# Patient Record
Sex: FEMALE | Race: BLACK OR AFRICAN AMERICAN | URBAN - METROPOLITAN AREA
[De-identification: names, ages, dates, MRNs, and addresses within clinical notes are randomized per-mention and may not be internally consistent; named-entity substitution may affect disease eponyms.]

---

## 2017-02-17 ENCOUNTER — OFFICE VISIT (OUTPATIENT)
Dept: CARDIOLOGY CLINIC | Age: 57
End: 2017-02-17

## 2017-02-17 VITALS
WEIGHT: 142 LBS | HEIGHT: 60 IN | SYSTOLIC BLOOD PRESSURE: 137 MMHG | DIASTOLIC BLOOD PRESSURE: 77 MMHG | BODY MASS INDEX: 27.88 KG/M2 | HEART RATE: 76 BPM

## 2017-02-17 DIAGNOSIS — I10 ESSENTIAL HYPERTENSION: Primary | ICD-10-CM

## 2017-02-17 DIAGNOSIS — B20 HIV (HUMAN IMMUNODEFICIENCY VIRUS INFECTION) (HCC): ICD-10-CM

## 2017-02-17 DIAGNOSIS — R07.9 CHEST PAIN, UNSPECIFIED TYPE: ICD-10-CM

## 2017-02-17 NOTE — PROGRESS NOTES
1. Have you been to the ER, urgent care clinic since your last visit? Hospitalized since your last visit? Yes, [facility]    2. Have you seen or consulted any other health care providers outside of the [address] [place] [address] since your last visit? Include any pap smears or colon screening. Yes, pcp  ENT   3. Since your last visit, have you had any of the following symptoms? Chest pain aching, palpitations and some dizziness          4. Have you had any blood work, X-rays or cardiac testing? Yes, [facility]        5. Where do you normally have your labs drawn?   [facility]    6. Do you need any refills today?    no

## 2017-02-17 NOTE — PROGRESS NOTES
HISTORY OF PRESENT ILLNESS  Cynthia Gardner is a 60 y.o. female. HPI Comments:       Chest Pain (Angina)    The history is provided by the patient. This is a recurrent problem. The current episode started more than 1 week ago. The problem has been gradually improving. The problem occurs every several days. The pain is associated with rest and movement. The pain is present in the left side. The pain is mild. The quality of the pain is described as dull. The pain does not radiate. Associated symptoms include shortness of breath. Pertinent negatives include no abdominal pain, no claudication, no cough, no dizziness, no fever, no headaches, no hemoptysis, no nausea, no orthopnea, no palpitations, no PND, no sputum production, no vomiting and no weakness. Valvular Heart Disease   Associated symptoms include chest pain and shortness of breath. Pertinent negatives include no abdominal pain and no headaches. Review of Systems   Constitutional: Negative for chills and fever. HENT: Negative for nosebleeds. Eyes: Negative for blurred vision and double vision. Respiratory: Positive for shortness of breath. Negative for cough, hemoptysis, sputum production and wheezing. Cardiovascular: Positive for chest pain. Negative for palpitations, orthopnea, claudication, leg swelling and PND. Gastrointestinal: Negative for abdominal pain, heartburn, nausea and vomiting. Musculoskeletal: Negative for myalgias. Skin: Negative for rash. Neurological: Negative for dizziness, weakness and headaches. Endo/Heme/Allergies: Does not bruise/bleed easily.      Family History   Problem Relation Age of Onset    Heart Disease Other      Positive family history of ischemic heart disease    Heart Surgery Neg Hx     Heart Attack Neg Hx        Past Medical History   Diagnosis Date    Chest pain, unspecified      Likely GERD, noncardiac, normal nuc scan in past    Chest pain, unspecified 2/20/2014     likely non cardiac  negative maximal est monitor clinically     GERD (gastroesophageal reflux disease)     HIV (human immunodeficiency virus infection) ([facility] [address] [address] 2/20/2014    Human immunodeficiency virus (HIV) [facility] [address] [facility]     Mitral valve disorders      No MVP reported on echo: 11/2008, trace MR    Multiple lesions of mitral and aortic valve      Valve lesions    Other and unspecified hyperlipidemia     Palpitations 12/12/2014     occasional episodes more with caffein discussed diet     Shortness of breath 12/12/2014     exertional r/o cmp        Past Surgical History   Procedure Laterality Date    Hx breast reduction      Hx other surgical       esophageal dilatation       Social History   Substance Use Topics    Smoking status: Never Smoker    Smokeless tobacco: Never Used    Alcohol use No       Allergies   Allergen Reactions    Latex Rash    Penicillins Not Reported This Time     Penicillin G Sodium *PENICILLINS*    Shellfish Derived Hives and Itching       Outpatient Prescriptions Marked as Taking for the 2/17/17 encounter (Office Visit) with Tong Mancilla MD   Medication Sig Dispense Refill    Omeprazole delayed release (PRILOSEC D/R) 20 mg tablet Take 20 mg by mouth daily.  abacavir-dolutegravir-lamiVUDine (TRIUMEQ) tablet Take  by mouth daily.  amLODIPine (NORVASC) 2.5 mg tablet Take  by mouth daily.  loratadine 10 mg cap Take  by mouth as needed.  ACETAMINOPHEN (TYLENOL EXTRA STRENGTH PO) Take  by mouth.  aspirin delayed-release (ASPIR-81) 81 mg tablet Take  by mouth daily. Visit Vitals    /77    Pulse 76    Ht 5' (1.524 m)    Wt 64.4 kg (142 lb)    BMI 27.73 kg/m2       Physical Exam   Constitutional: She is oriented to person, place, and time. She appears well-developed and well-nourished. HENT:   Head: Normocephalic and atraumatic. Eyes: Conjunctivae are normal.   Neck: Neck supple. No JVD present. No tracheal deviation present. No thyromegaly present. Cardiovascular: Normal rate and regular rhythm. PMI is not displaced. Exam reveals no gallop, no S3 and no decreased pulses. No murmur heard. Pulmonary/Chest: No respiratory distress. She has no wheezes. She has no rales. She exhibits no tenderness. Abdominal: Soft. There is no tenderness. Musculoskeletal: She exhibits no edema. Neurological: She is alert and oriented to person, place, and time. Skin: Skin is warm. Psychiatric: She has a normal mood and affect. Ms. Chasity Thrasher has a reminder for a \"due or due soon\" health maintenance. I have asked that she contact her primary care provider for follow-up on this health maintenance. CARDIOLOGY STUDIES 12/1/2010 6/1/2009 11/1/2008   Myocardial Perfusion Scan Result - nl scan, EF 80% -   Echocardiogram - Complete Result - - EF 65%, trace MR, trace TR   24 hr Holter Monitor Result Within Normal Limits; NSR with rare PAC's and PVC's - -     SUMMARY:echo:12/2014  Left ventricle: Systolic function was normal. Ejection fraction was  estimated in the range of 55 % to 60 %. There were no regional wall motion  abnormalities. Mitral valve: No echocardiographic evidence for prolapse. There was  trivial regurgitation. Tricuspid valve: Insufficient tricuspid regurgitation to estimate  pulmonary artery pressure. Conclusion: 12/2014  1. Negative ischemic ST-T changes at 85% of predicted maximal heart rate. 2. Normal functional capacity. 3. Appropriate heart rate and blood pressure response. 4. Essentially normal stress echo findings but this should be called inconclusive as she did not maintain a very high heart rate during the post-stress echo images. 5. Clinical correlation is suggested. I have personally reviewed patient's records available from hospital and other providers and incorporated findings in patient care. 4/2016  Er note,lab,ekg    Assessment         ICD-10-CM ICD-9-CM    1.  Essential hypertension I10 401.9 controlled  occasional headache   2. HIV (human immunodeficiency virus infection) ([facility] [address] Z21 V08     stable   3. Chest pain, unspecified type R07.9 786.50     atypical non cardiac  negative stress test in past     Will monitor clinically as chest pain atypical and stable now  Medications Discontinued During This Encounter   Medication Reason    albuterol (PROVENTIL HFA, VENTOLIN HFA, PROAIR HFA) 90 mcg/actuation inhaler Not A Current Medication       No orders of the defined types were placed in this encounter. Follow-up Disposition:  Return in about 1 year (around 2/17/2018).

## 2017-02-17 NOTE — LETTER
Marvacee Canavan 1960 2/17/2017 Dear Winsome Roa MD 
 
I had the pleasure of evaluating  Ms. Leonardo Rodríguez in office today. Below are the relevant portions of my assessment and plan of care. ICD-10-CM ICD-9-CM 1. Essential hypertension I10 401.9   
 controlled 
occasional headache 2. HIV (human immunodeficiency virus infection) ([facility] [address] Z21 V08   
 stable 3. Chest pain, unspecified type R07.9 786.50   
 atypical non cardiac 
negative stress test in past  
 
 
Current Outpatient Prescriptions Medication Sig Dispense Refill  Omeprazole delayed release (PRILOSEC D/R) 20 mg tablet Take 20 mg by mouth daily.  abacavir-dolutegravir-lamiVUDine (TRIUMEQ) tablet Take  by mouth daily.  amLODIPine (NORVASC) 2.5 mg tablet Take  by mouth daily.  loratadine 10 mg cap Take  by mouth as needed.  ACETAMINOPHEN (TYLENOL EXTRA STRENGTH PO) Take  by mouth.  aspirin delayed-release (ASPIR-81) 81 mg tablet Take  by mouth daily. No orders of the defined types were placed in this encounter. If you have questions, please do not hesitate to call me. I look forward to following Ms. Jacome along with you. Sincerely, Rakesh Montano MD

## 2017-02-17 NOTE — MR AVS SNAPSHOT
Visit Information Date & Time Provider Department Dept. Phone Encounter #  
 2/17/2017 12:00 PM Edward Brunson MD [facility] [phone] Follow-up Instructions Return in about 1 year (around 2/17/2018). Your Appointments 2/15/2018 11:45 AM  
Follow Up with Edward Brunson MD  
[facility] ([facility]) Appt Note: 1 year [address] [place] [address] [address]  
  
   
 [address] [phone] [address] [address] Upcoming Health Maintenance Date Due Hepatitis C Screening 1960 Pneumococcal 19-64 Highest Risk (1 of 3 - PCV13) 7/23/1977 DTaP/Tdap/Td series (1 - Tdap) 7/23/1979 PAP AKA CERVICAL CYTOLOGY 7/23/1979 BREAST CANCER SCRN MAMMOGRAM 7/23/2008 FOBT Q 1 YEAR AGE 50-75 7/23/2008 INFLUENZA AGE 9 TO ADULT 8/1/2016 Allergies as of 2/17/2017  Review Complete On: 2/17/2017 By: Edward Brunson MD  
  
 Severity Noted Reaction Type Reactions Latex  06/27/2016    Rash Penicillins    Not Reported This Time Penicillin G Sodium *PENICILLINS* Shellfish Derived  06/27/2016    Hives, Itching Current Immunizations  Never Reviewed No immunizations on file. Not reviewed this visit You Were Diagnosed With   
  
 Codes Comments Essential hypertension    -  Primary ICD-10-CM: I10 
ICD-9-CM: 401.9 controlled 
occasional headache  
 HIV (human immunodeficiency virus infection) ([facility]     ICD-10-CM: Z82 ICD-9-CM: V08 stable Chest pain, unspecified type     ICD-10-CM: R07.9 ICD-9-CM: 786.50 atypical non cardiac 
negative stress test in past  
  
Vitals BP Pulse Height(growth percentile) Weight(growth percentile) BMI OB Status 137/77 76 5' (1.524 m) 142 lb (64.4 kg) 27.73 kg/m2 Postmenopausal  
 Smoking Status Never Smoker BMI and BSA Data  Body Mass Index Body Surface Area  
 27.73 kg/m 2 1.65 m 2  
  
  
 Preferred Pharmacy Pharmacy Name Phone [facility] [address] [facility], [address] [place] [address] Rd [phone] Your Updated Medication List  
  
   
This list is accurate as of: 2/17/17 12:25 PM.  Always use your most recent med list.  
  
  
  
  
 abacavir-dolutegravir-lamiVUDine tablet Commonly known as:  [place] Take  by mouth daily. amLODIPine 2.5 mg tablet Commonly known as:  [place] Take  by mouth daily. ASPIR-81 81 mg tablet Generic drug:  aspirin delayed-release Take  by mouth daily. loratadine 10 mg Cap Take  by mouth as needed. Omeprazole delayed release 20 mg tablet Commonly known as:  PRILOSEC D/R Take 20 mg by mouth daily. TYLENOL EXTRA STRENGTH PO Take  by mouth. Follow-up Instructions Return in about 1 year (around 2/17/2018). Introducing [facility] SERVICES! Blayne Gomez introduces [employer] patient portal. Now you can access parts of your medical record, email your doctor's office, and request medication refills online. 1. In your internet browser, go to [email or web address] 2. Click on the First Time User? Click Here link in the Sign In box. You will see the New Member Sign Up page. 3. Enter your [employer] Access Code exactly as it appears below. You will not need to use this code after youve completed the sign-up process. If you do not sign up before the expiration date, you must request a new code. · [employer] Access Code: [email or web address] Expires: 5/18/2017 11:50 AM 
 
4. Enter the last four digits of your Social Security Number (xxxx) and Date of Birth (mm/dd/yyyy) as indicated and click Submit. You will be taken to the next sign-up page. 5. Create a [employer] ID. This will be your [employer] login ID and cannot be changed, so think of one that is secure and easy to remember. 6. Create a [employer] password. You can change your password at any time. 7. Enter your Password Reset Question and Answer. This can be used at a later time if you forget your password. 8. Enter your e-mail address. You will receive e-mail notification when new information is available in [address] 9. Click Sign Up. You can now view and download portions of your medical record. 10. Click the Download Summary menu link to download a portable copy of your medical information. If you have questions, please visit the Frequently Asked Questions section of the [employer] website. Remember, [employer] is NOT to be used for urgent needs. For medical emergencies, dial 911. Now available from your iPhone and Android! Please provide this summary of care documentation to your next provider. Your primary care clinician is listed as Ernesto Garza. If you have any questions after today's visit, please call [phone].

## 2018-02-16 ENCOUNTER — OFFICE VISIT (OUTPATIENT)
Dept: CARDIOLOGY CLINIC | Age: 58
End: 2018-02-16

## 2018-02-16 VITALS
BODY MASS INDEX: 28.82 KG/M2 | SYSTOLIC BLOOD PRESSURE: 139 MMHG | WEIGHT: 146.8 LBS | HEART RATE: 74 BPM | DIASTOLIC BLOOD PRESSURE: 76 MMHG | HEIGHT: 60 IN

## 2018-02-16 DIAGNOSIS — I10 ESSENTIAL HYPERTENSION: Primary | ICD-10-CM

## 2018-02-16 DIAGNOSIS — B20 HIV (HUMAN IMMUNODEFICIENCY VIRUS INFECTION) (HCC): ICD-10-CM

## 2018-02-16 DIAGNOSIS — R07.9 CHEST PAIN, UNSPECIFIED TYPE: ICD-10-CM

## 2018-02-16 NOTE — LETTER
Gunjan Ly 1960 2/16/2018 Dear Peg Gonzales MD 
 
I had the pleasure of evaluating  Ms. Fabiene Holter in office today. Below are the relevant portions of my assessment and plan of care. ICD-10-CM ICD-9-CM 1. Essential hypertension I10 401.9   
 controlled 
continue treatment 2. HIV (human immunodeficiency virus infection) ([facility] [address] B20 V08   
 stable 
on meds 3. Chest pain, unspecified type R07.9 786.50   
 atypicalstable 
monitor clinically Current Outpatient Prescriptions Medication Sig Dispense Refill  abacavir-dolutegravir-lamiVUDine (TRIUMEQ) tablet Take  by mouth daily.  amLODIPine (NORVASC) 2.5 mg tablet Take 5 mg by mouth daily.  ACETAMINOPHEN (TYLENOL EXTRA STRENGTH PO) Take  by mouth.  aspirin delayed-release (ASPIR-81) 81 mg tablet Take  by mouth daily. No orders of the defined types were placed in this encounter. If you have questions, please do not hesitate to call me. I look forward to following Ms. GarciaElaineMaite along with you. Sincerely, Brian Butts MD

## 2018-02-16 NOTE — PROGRESS NOTES
HISTORY OF PRESENT ILLNESS  Parrish Lyle is a 59 y.o. female. HPI Comments: Recovering from uri        Hypertension   The history is provided by the patient. This is a chronic problem. The problem occurs constantly. The problem has not changed since onset. Associated symptoms include chest pain and shortness of breath. Pertinent negatives include no abdominal pain and no headaches. Chest Pain (Angina)    The history is provided by the patient. This is a recurrent problem. The current episode started more than 1 week ago. The problem has been gradually improving. The problem occurs every several days. The pain is associated with rest and movement. The pain is present in the left side. The pain is mild. The quality of the pain is described as dull. The pain does not radiate. Associated symptoms include shortness of breath. Pertinent negatives include no abdominal pain, no claudication, no cough, no dizziness, no fever, no headaches, no hemoptysis, no nausea, no orthopnea, no palpitations, no PND, no sputum production, no vomiting and no weakness. Review of Systems   Constitutional: Negative for chills and fever. HENT: Negative for nosebleeds. Eyes: Negative for blurred vision and double vision. Respiratory: Positive for shortness of breath. Negative for cough, hemoptysis, sputum production and wheezing. Cardiovascular: Positive for chest pain. Negative for palpitations, orthopnea, claudication, leg swelling and PND. Gastrointestinal: Negative for abdominal pain, heartburn, nausea and vomiting. Musculoskeletal: Negative for myalgias. Skin: Negative for rash. Neurological: Negative for dizziness, weakness and headaches. Endo/Heme/Allergies: Does not bruise/bleed easily.      Family History   Problem Relation Age of Onset    Heart Disease Other      Positive family history of ischemic heart disease    Heart Surgery Neg Hx     Heart Attack Neg Hx        Past Medical History: Diagnosis Date    Chest pain, unspecified     Likely GERD, noncardiac, normal nuc scan in past    Chest pain, unspecified 2/20/2014    likely non cardiac  negative maximal est monitor clinically     GERD (gastroesophageal reflux disease)     HIV (human immunodeficiency virus infection) ([facility] [address] 2/20/2014    Human immunodeficiency virus (HIV) ([facility] [address]     Mitral valve disorders(424.0)     No MVP reported on echo: 11/2008, trace MR    Multiple lesions of mitral and aortic valve     Valve lesions    Other and unspecified hyperlipidemia     Palpitations 12/12/2014    occasional episodes more with caffein discussed diet     Shortness of breath 12/12/2014    exertional r/o cmp        Past Surgical History:   Procedure Laterality Date    HX BREAST REDUCTION      HX OTHER SURGICAL      esophageal dilatation       Social History   Substance Use Topics    Smoking status: Never Smoker    Smokeless tobacco: Never Used    Alcohol use No       Allergies   Allergen Reactions    Latex Rash    Penicillins Not Reported This Time     Penicillin G Sodium *PENICILLINS*    Shellfish Derived Hives and Itching       Outpatient Prescriptions Marked as Taking for the 2/16/18 encounter (Office Visit) with Juan Osorio MD   Medication Sig Dispense Refill    abacavir-dolutegravir-lamiVUDine (TRIUMEQ) tablet Take  by mouth daily.  amLODIPine (NORVASC) 2.5 mg tablet Take 5 mg by mouth daily.  ACETAMINOPHEN (TYLENOL EXTRA STRENGTH PO) Take  by mouth.  aspirin delayed-release (ASPIR-81) 81 mg tablet Take  by mouth daily. Visit Vitals    /76    Pulse 74    Ht 5' (1.524 m)    Wt 66.6 kg (146 lb 12.8 oz)    BMI 28.67 kg/m2       Physical Exam   Constitutional: She is oriented to person, place, and time. She appears well-developed and well-nourished. HENT:   Head: Normocephalic and atraumatic. Eyes: Conjunctivae are normal.   Neck: Neck supple. No JVD present. No tracheal deviation present.  No thyromegaly present. Cardiovascular: Normal rate and regular rhythm. PMI is not displaced. Exam reveals no gallop, no S3 and no decreased pulses. No murmur heard. Pulmonary/Chest: No respiratory distress. She has no wheezes. She has no rales. She exhibits no tenderness. Abdominal: Soft. There is no tenderness. Musculoskeletal: She exhibits no edema. Neurological: She is alert and oriented to person, place, and time. Skin: Skin is warm. Psychiatric: She has a normal mood and affect. Ms. Bettye Mcardle has a reminder for a \"due or due soon\" health maintenance. I have asked that she contact her primary care provider for follow-up on this health maintenance. CARDIOLOGY STUDIES 12/1/2010 6/1/2009 11/1/2008   Myocardial Perfusion Scan Result - nl scan, EF 80% -   Echocardiogram - Complete Result - - EF 65%, trace MR, trace TR   24 hr Holter Monitor Result Within Normal Limits; NSR with rare PAC's and PVC's - -   Some recent data might be hidden     SUMMARY:echo:12/2014  Left ventricle: Systolic function was normal. Ejection fraction was  estimated in the range of 55 % to 60 %. There were no regional wall motion  abnormalities. Mitral valve: No echocardiographic evidence for prolapse. There was  trivial regurgitation. Tricuspid valve: Insufficient tricuspid regurgitation to estimate  pulmonary artery pressure. Conclusion: 12/2014  1. Negative ischemic ST-T changes at 85% of predicted maximal heart rate. 2. Normal functional capacity. 3. Appropriate heart rate and blood pressure response. 4. Essentially normal stress echo findings but this should be called inconclusive as she did not maintain a very high heart rate during the post-stress echo images. 5. Clinical correlation is suggested. I have personally reviewed patient's records available from hospital and other providers and incorporated findings in patient care.   4/2016  Er note,lab,ekg    Assessment         ICD-10-CM ICD-9-CM    1. Essential hypertension I10 401.9     controlled  continue treatment   2. HIV (human immunodeficiency virus infection) ([facility] [address] B20 V08     stable  on meds   3. Chest pain, unspecified type R07.9 786.50     atypicalstable  monitor clinically     Will monitor clinically as chest pain atypical and stable now  Medications Discontinued During This Encounter   Medication Reason    loratadine 10 mg cap Not A Current Medication    Omeprazole delayed release (PRILOSEC D/R) 20 mg tablet Not A Current Medication       No orders of the defined types were placed in this encounter. Follow-up Disposition:  Return in about 1 year (around 2/16/2019).

## 2018-02-16 NOTE — MR AVS SNAPSHOT
[address] [facility] 
 
 
 [address] [facility] 
[phone] Patient: Fabrizio Birch MRN: [id number] Visit Information Date & Time Provider Department Dept. Phone Encounter #  
 2018  9:00 AM Domenico Hurd MD [facility] [phone] Follow-up Instructions Return in about 1 year (around 2019). Upcoming Health Maintenance Date Due Hepatitis C Screening 1960 Pneumococcal 19-64 Highest Risk (1 of 3 - PCV13) 1977 DTaP/Tdap/Td series (1 - Tdap) 1979 PAP AKA CERVICAL CYTOLOGY 1979 BREAST CANCER SCRN MAMMOGRAM 2008 FOBT Q 1 YEAR AGE 50-75 2008 Influenza Age 5 to Adult 2017 Allergies as of 2018  Review Complete On: 2018 By: Domenico Hurd MD  
  
 Severity Noted Reaction Type Reactions Latex  2016    Rash Penicillins    Not Reported This Time Penicillin G Sodium *PENICILLINS* Shellfish Derived  2016    Hives, Itching Current Immunizations  Never Reviewed No immunizations on file. Not reviewed this visit You Were Diagnosed With   
  
 Codes Comments Essential hypertension    -  Primary ICD-10-CM: I10 
ICD-9-CM: 401.9 controlled 
continue treatment HIV [facility] immunodeficiency virus [facility]) [facility]     ICD-10-CM: B20 
ICD-9-CM: V08 stable 
on meds Chest pain, unspecified type     ICD-10-CM: R07.9 ICD-9-CM: 786.50 atypicalstable 
monitor clinically Vitals BP Pulse Height(growth percentile) Weight(growth percentile) BMI OB Status 139/76 74 5' (1.524 m) 146 lb 12.8 oz (66.6 kg) 28.67 kg/m2 Postmenopausal  
 Smoking Status Never Smoker Vitals History BMI and BSA Data Body Mass Index Body Surface Area  
 28.67 kg/m 2 1.68 m 2 Preferred Pharmacy Pharmacy Name Phone [facility] [address] [facility], [address] [phone] Your Updated Medication List  
  
   
This list is accurate as of: 2/16/18  9:25 AM.  Always use your most recent med list.  
  
  
  
  
 abacavir-dolutegravir-lamiVUDine tablet Commonly known as:  Frank Rack Take  by mouth daily. amLODIPine 2.5 mg tablet Commonly known as:  Tara Armando Take 5 mg by mouth daily. ASPIR-81 81 mg tablet Generic drug:  aspirin delayed-release Take  by mouth daily. TYLENOL EXTRA STRENGTH PO Take  by mouth. Follow-up Instructions Return in about 1 year (around 2/16/2019). Introducing [facility]! Rocio Potter introduces [employer] patient portal. Now you can access parts of your medical record, email your doctor's office, and request medication refills online. 1. In your internet browser, go to [email or web address] 2. Click on the First Time User? Click Here link in the Sign In box. You will see the New Member Sign Up page. 3. Enter your [employer] Access Code exactly as it appears below. You will not need to use this code after youve completed the sign-up process. If you do not sign up before the expiration date, you must request a new code. · [employer] Access Code: [email or web address] Expires: 5/17/2018  9:03 AM 
 
4. Enter the last four digits of your Social Security Number (xxxx) and Date of Birth (mm/dd/yyyy) as indicated and click Submit. You will be taken to the next sign-up page. 5. Create a Food Evolutiont ID. This will be your [employer] login ID and cannot be changed, so think of one that is secure and easy to remember. 6. Create a [employer] password. You can change your password at any time. 7. Enter your Password Reset Question and Answer. This can be used at a later time if you forget your password. 8. Enter your e-mail address. You will receive e-mail notification when new information is available in [address] 9. Click Sign Up. You can now view and download portions of your medical record. 10. Click the Download Summary menu link to download a portable copy of your medical information. If you have questions, please visit the Frequently Asked Questions section of the [employer] website. Remember, [employer] is NOT to be used for urgent needs. For medical emergencies, dial 911. Now available from your iPhone and Android! Please provide this summary of care documentation to your next provider. Your primary care clinician is listed as Park Nazario. If you have any questions after today's visit, please call [phone].

## 2018-02-16 NOTE — PROGRESS NOTES
1. Have you been to the ER, urgent care clinic since your last visit? Hospitalized since your last visit? No    2. Have you seen or consulted any other health care providers outside of the [address] [place] [address] since your last visit? Include any pap smears or colon screening. Yes Where: Dr Ashkan Burr/PCP     3. Since your last visit, have you had any of the following symptoms? chest pains, shortness of breath and dizziness. 4.  Have you had any blood work, X-rays or cardiac testing? Yes Where: [facility] Reason for visit: Labs             5.  Where do you normally have your labs drawn? PCP Office    6. Do you need any refills today?    No

## 2019-02-28 ENCOUNTER — OFFICE VISIT (OUTPATIENT)
Dept: CARDIOLOGY CLINIC | Age: 59
End: 2019-02-28

## 2019-02-28 VITALS
HEART RATE: 72 BPM | BODY MASS INDEX: 28.07 KG/M2 | DIASTOLIC BLOOD PRESSURE: 80 MMHG | SYSTOLIC BLOOD PRESSURE: 134 MMHG | HEIGHT: 60 IN | WEIGHT: 143 LBS

## 2019-02-28 DIAGNOSIS — B20 HIV (HUMAN IMMUNODEFICIENCY VIRUS INFECTION) (HCC): ICD-10-CM

## 2019-02-28 DIAGNOSIS — I10 ESSENTIAL HYPERTENSION: Primary | ICD-10-CM

## 2019-02-28 RX ORDER — ACETAMINOPHEN 500 MG
1 TABLET ORAL 2 TIMES DAILY
Qty: 1 KIT | Refills: 0 | Status: SHIPPED | OUTPATIENT
Start: 2019-02-28 | End: 2020-06-04 | Stop reason: SDUPTHER

## 2019-02-28 NOTE — PROGRESS NOTES
1. Have you been to the ER, urgent care clinic since your last visit? Hospitalized since your last visit?     no    2. Have you seen or consulted any other health care providers outside of the [address] [place] [address] since your last visit? Include any pap smears or colon screening. Yes Where: pcp     3. Since your last visit, have you had any of the following symptoms? chest pains, shortness of breath and dizziness.

## 2019-02-28 NOTE — PATIENT INSTRUCTIONS
Cumulocity Activation    Thank you for requesting access to Cum[employer]. Please follow the instructions below to securely access and download your online medical record. [employer] allows you to send messages to your doctor, view your test results, renew your prescriptions, schedule appointments, and more. How Do I Sign Up? 1. In your internet browser, go to [email or web address]. 2. Click on the First Time User? Click Here link in the Sign In box. You will see the New Member Sign Up page. 3. Enter your [employer] Access Code exactly as it appears below. You will not need to use this code after youve completed the sign-up process. If you do not sign up before the expiration date, you must request a new code. [employer] Access Code: [email or web address]  Expires: 2019 12:00 PM (This is the date your [employer] access code will )    4. Enter the last four digits of your Social Security Number (xxxx) and Date of Birth (mm/dd/yyyy) as indicated and click Submit. You will be taken to the next sign-up page. 5. Create a Cum[employer]ocity ID. This will be your [employer] login ID and cannot be changed, so think of one that is secure and easy to remember. 6. Create a [employer] password. You can change your password at any time. 7. Enter your Password Reset Question and Answer. This can be used at a later time if you forget your password. 8. Enter your e-mail address. You will receive e-mail notification when new information is available in [address] 9. Click Sign Up. You can now view and download portions of your medical record. 10. Click the Download Summary menu link to download a portable copy of your medical information. Additional Information    If you have questions, please visit the Frequently Asked Questions section of the [employer] website at [email or web address] Remember, [employer] is NOT to be used for urgent needs. For medical emergencies, dial 911.

## 2019-02-28 NOTE — LETTER
Sophy Diana 1960 Dear Cristian Smalls MD 
 
I had the pleasure of evaluating  Ms. Lillie Montiel in office today. Below are the relevant portions of my assessment and plan of care. ICD-10-CM ICD-9-CM 1. Essential hypertension I10 401.9 Blood pressure uncontrolled today. Usually normal.  Will monitor at home and decide on treatment 2. HIV (human immunodeficiency virus infection) ([facility] B20 V08 On treatment. Clinically stable. No cardiomyopathy in past  
 
 
Current Outpatient Medications Medication Sig Dispense Refill  dextromethorphan-guaiFENesin (TUSSIN DM COUGH AND CHEST)  mg/5 mL liqd syrup Take  by mouth.  GREEN TEA EXTRACT PO Take  by mouth.  Blood Pressure Monitor (BLOOD PRESSURE KIT) kit 1 Device by Does Not Apply route two (2) times a day. 1 Kit 0  
 abacavir-dolutegravir-lamiVUDine (TRIUMEQ) tablet Take  by mouth daily.  amLODIPine (NORVASC) 5 mg tablet Take 5 mg by mouth daily.  ACETAMINOPHEN (TYLENOL EXTRA STRENGTH PO) Take  by mouth.  aspirin delayed-release (ASPIR-81) 81 mg tablet Take  by mouth daily. Orders Placed This Encounter  dextromethorphan-guaiFENesin (TUSSIN DM COUGH AND CHEST)  mg/5 mL liqd syrup Sig: Take  by mouth.  GREEN TEA EXTRACT PO Sig: Take  by mouth.  Blood Pressure Monitor (BLOOD PRESSURE KIT) kit Si Device by Does Not Apply route two (2) times a day. Dispense:  1 Kit Refill:  0 If you have questions, please do not hesitate to call me. I look forward to following Ms. Jacome along with you. Sincerely, Jose Pierce MD 
 
 

COUGH/THROAT PAIN

## 2019-02-28 NOTE — PROGRESS NOTES
HISTORY OF PRESENT ILLNESS  Kimber Lopez is a 59 y.o. female. Patient has. Of coughing because of mucus collection. With heavy coughing she has chest pain otherwise she is stable      Hypertension   The history is provided by the patient. This is a chronic problem. The problem occurs constantly. The problem has not changed since onset. Associated symptoms include chest pain. Pertinent negatives include no abdominal pain, no headaches and no shortness of breath. Chest Pain (Angina)    The history is provided by the patient. This is a chronic problem. The problem has not changed since onset. The problem occurs every several days. The pain is associated with coughing. The pain is present in the left side. The pain is mild. The quality of the pain is described as dull. The pain does not radiate. Pertinent negatives include no abdominal pain, no claudication, no cough, no dizziness, no fever, no headaches, no hemoptysis, no nausea, no orthopnea, no palpitations, no PND, no shortness of breath, no sputum production, no vomiting and no weakness. Review of Systems   Constitutional: Negative for chills and fever. HENT: Negative for nosebleeds. Eyes: Negative for blurred vision and double vision. Respiratory: Negative for cough, hemoptysis, sputum production, shortness of breath and wheezing. Cardiovascular: Positive for chest pain. Negative for palpitations, orthopnea, claudication, leg swelling and PND. Gastrointestinal: Negative for abdominal pain, heartburn, nausea and vomiting. Musculoskeletal: Negative for myalgias. Skin: Negative for rash. Neurological: Negative for dizziness, weakness and headaches. Endo/Heme/Allergies: Does not bruise/bleed easily.      Family History   Problem Relation Age of Onset    Heart Disease Other         Positive family history of ischemic heart disease    Heart Surgery Neg Hx     Heart Attack Neg Hx        Past Medical History:   Diagnosis Date    Chest pain, unspecified     Likely GERD, noncardiac, normal nuc scan in past    Chest pain, unspecified 2/20/2014    likely non cardiac  negative maximal est monitor clinically     GERD (gastroesophageal reflux disease)     HIV (human immunodeficiency virus infection) ([facility] 2/20/2014    Human immunodeficiency virus (HIV) ([facility]     Mitral valve disorders(424.0)     No MVP reported on echo: 11/2008, trace MR    Multiple lesions of mitral and aortic valve     Valve lesions    Other and unspecified hyperlipidemia     Palpitations 12/12/2014    occasional episodes more with caffein discussed diet     Shortness of breath 12/12/2014    exertional r/o cmp        Past Surgical History:   Procedure Laterality Date    HX BREAST REDUCTION      HX OTHER SURGICAL      esophageal dilatation       Social History     Tobacco Use    Smoking status: Never Smoker    Smokeless tobacco: Never Used   Substance Use Topics    Alcohol use: No       Allergies   Allergen Reactions    Latex Rash    Penicillins Not Reported This Time     Penicillin G Sodium *PENICILLINS*    Shellfish Derived Hives and Itching           Visit Vitals  /80   Pulse 72   Ht 5' (1.524 m)   Wt 64.9 kg (143 lb)   BMI 27.93 kg/m²       Physical Exam   Constitutional: She is oriented to person, place, and time. She appears well-developed and well-nourished. HENT:   Head: Normocephalic and atraumatic. Eyes: Conjunctivae are normal.   Neck: Neck supple. No JVD present. No tracheal deviation present. No thyromegaly present. Cardiovascular: Normal rate and regular rhythm. PMI is not displaced. Exam reveals no gallop, no S3 and no decreased pulses. No murmur heard. Pulmonary/Chest: No respiratory distress. She has no wheezes. She has no rales. She exhibits no tenderness. Abdominal: Soft. There is no tenderness. Musculoskeletal: She exhibits no edema. Neurological: She is alert and oriented to person, place, and time.    Skin: Skin is warm. Psychiatric: She has a normal mood and affect. Ms. Whit Thomas has a reminder for a \"due or due soon\" health maintenance. I have asked that she contact her primary care provider for follow-up on this health maintenance. CARDIOLOGY STUDIES 12/1/2010 6/1/2009 11/1/2008   Myocardial Perfusion Scan Result - nl scan, EF 80% -   Echocardiogram - Complete Result - - EF 65%, trace MR, trace TR   24 hr Holter Monitor Result Within Normal Limits; NSR with rare PAC's and PVC's - -   Some recent data might be hidden     SUMMARY:echo:12/2014  Left ventricle: Systolic function was normal. Ejection fraction was  estimated in the range of 55 % to 60 %. There were no regional wall motion  abnormalities. Mitral valve: No echocardiographic evidence for prolapse. There was  trivial regurgitation. Tricuspid valve: Insufficient tricuspid regurgitation to estimate  pulmonary artery pressure. Conclusion: 12/2014  1. Negative ischemic ST-T changes at 85% of predicted maximal heart rate. 2. Normal functional capacity. 3. Appropriate heart rate and blood pressure response. 4. Essentially normal stress echo findings but this should be called inconclusive as she did not maintain a very high heart rate during the post-stress echo images. 5. Clinical correlation is suggested. I have personally reviewed patient's records available from hospital and other providers and incorporated findings in patient care. 4/2016  Er note,lab,ekg    Assessment         ICD-10-CM ICD-9-CM    1. Essential hypertension I10 401.9     Blood pressure uncontrolled today. Usually normal.  Will monitor at home and decide on treatment   2. HIV (human immunodeficiency virus infection) ([facility] B20 V08     On treatment. Clinically stable. No cardiomyopathy in past     Will monitor clinically as chest pain atypical and stable now  2/2019  Cardiac status stable. Blood pressure elevated here in office initially. Normal on recheck. Will check at home and decide on medication change currently taking amlodipine 5 mg a day  There are no discontinued medications. Orders Placed This Encounter    Blood Pressure Monitor (BLOOD PRESSURE KIT) kit     Si Device by Does Not Apply route two (2) times a day. Dispense:  1 Kit     Refill:  0       Follow-up Disposition:  Return in about 1 year (around 2020).

## 2020-06-04 ENCOUNTER — VIRTUAL VISIT (OUTPATIENT)
Dept: CARDIOLOGY CLINIC | Age: 60
End: 2020-06-04

## 2020-06-04 VITALS — WEIGHT: 138 LBS | HEIGHT: 60 IN | BODY MASS INDEX: 27.09 KG/M2

## 2020-06-04 DIAGNOSIS — Z21 ASYMPTOMATIC HIV INFECTION (HCC): ICD-10-CM

## 2020-06-04 DIAGNOSIS — I10 ESSENTIAL HYPERTENSION: Primary | ICD-10-CM

## 2020-06-04 RX ORDER — ACETAMINOPHEN 500 MG
1 TABLET ORAL 2 TIMES DAILY
Qty: 1 KIT | Refills: 0 | Status: SHIPPED | OUTPATIENT
Start: 2020-06-04

## 2020-06-04 NOTE — PROGRESS NOTES
Consent: Mynor Gudino, who was seen by synchronous (real-time) audio-video technology, and/or her healthcare decision maker, is aware that this patient-initiated, Telehealth encounter on 6/4/2020 is a billable service, with coverage as determined by her insurance carrier. She is aware that she may receive a bill and has provided verbal consent to proceed: Yes. Subjective:   Mynor Gudino is a 62 y.o. female who was seen for Hypertension (1 year follow up)    Patient seen today for virtual visit. On follow up patient denies any chest pains,sob, palpitation or other significant symptoms. Family History   Problem Relation Age of Onset    Heart Disease Other         Positive family history of ischemic heart disease    Heart Surgery Neg Hx     Heart Attack Neg Hx      Past Surgical History:   Procedure Laterality Date    HX BREAST REDUCTION      HX OTHER SURGICAL      esophageal dilatation     Allergies   Allergen Reactions    Latex Rash    Penicillins Not Reported This Time     Penicillin G Sodium *PENICILLINS*    Shellfish Derived Hives and Itching       Current Outpatient Medications:     Blood Pressure Monitor (Blood Pressure Kit) kit, 1 Device by Does Not Apply route two (2) times a day., Disp: 1 Kit, Rfl: 0    GREEN TEA EXTRACT PO, Take  by mouth., Disp: , Rfl:     abacavir-dolutegravir-lamiVUDine (TRIUMEQ) tablet, Take  by mouth daily. , Disp: , Rfl:     amLODIPine (NORVASC) 5 mg tablet, Take 5 mg by mouth daily. , Disp: , Rfl:     ACETAMINOPHEN (TYLENOL EXTRA STRENGTH PO), Take  by mouth., Disp: , Rfl:     aspirin delayed-release (ASPIR-81) 81 mg tablet, Take  by mouth daily. , Disp: , Rfl:   Allergies   Allergen Reactions    Latex Rash    Penicillins Not Reported This Time     Penicillin G Sodium *PENICILLINS*    Shellfish Derived Hives and Itching         Review of Systems   Review of Systems   Constitutional: Negative for chills and fever.    HENT: Negative for nosebleeds. Eyes: Negative for blurred vision and double vision. Respiratory: See HPI   Cardiovascular: See HPI  Gastrointestinal: Negative for abdominal pain, heartburn, nausea and vomiting. Musculoskeletal: Negative for myalgias. Skin: Negative for rash. Neurological: Negative for dizziness, weakness and headaches. Endo/Heme/Allergies: Does not bruise/bleed easily. Objective:     Visit Vitals  Ht 5' (1.524 m)   Wt 62.6 kg (138 lb)   BMI 26.95 kg/m²      General: alert, cooperative, no distress   Mental  status: normal mood, behavior, speech, dress, motor activity, and thought processes, able to follow commands   HENT: NCAT   Neck: no visualized mass,No JVD   Resp: no respiratory distress   Neuro: no gross deficits   Skin: no discoloration or Bruise on visible areas   Psychiatric: normal affect, consistent with stated mood, no evidence of hallucinations     Additional exam findings:     Extremities:No edema     Pertinent LAB and reports  I have reviewed available  pertinent notes, labs and reports and included in current evaluation and management of this patient. Assessment & Plan:   Diagnoses and all orders for this visit:    1. Essential hypertension  Comments:  Stable continue current treatment    2. [facility]  Comments:  Stable. Cardiac status stable. Other orders  -     Blood Pressure Monitor (Blood Pressure Kit) kit; 1 Device by Does Not Apply route two (2) times a day. 6/2020  Cardiac status stable. Shortness of breath is improved. It was likely related to congestion. Prescribed blood pressure kit to monitor at home    Follow-up and Dispositions    · Return in about 1 year (around 6/4/2021). 712  We discussed the expected course, resolution and complications of the diagnosis(es) in detail. Medication risks, benefits, costs, interactions, and alternatives were discussed as indicated.   I advised her to contact the office if her condition worsens, changes or fails to improve as anticipated. She expressed understanding with the diagnosis(es) and plan. Regi Lopez is a 62 y.o. female being evaluated by a video visit encounter for concerns as above. A caregiver was present when appropriate. Due to this being a TeleHealth encounter (During [facility]- public health emergency), evaluation of the following organ systems was limited: Vitals/Constitutional/EENT/Resp/CV/GI//MS/Neuro/Skin/Heme-Lymph-Imm. Pursuant to the emergency declaration under the [facility], [facility] waiver authority and the [employer] Act, this Virtual  Visit was conducted, with patient's (and/or legal guardian's) consent, to reduce the patient's risk of exposure to COVID-19 and provide necessary medical care. Services were provided through a video synchronous discussion virtually to substitute for in-person clinic visit. I was in the office while conducting this encounter.         Karina Bautista MD

## 2020-06-04 NOTE — PATIENT INSTRUCTIONS
Webydo. Activation Thank you for requesting access to Weby[employer].. Please follow the instructions below to securely access and download your online medical record. Webydo. allows you to send messages to your doctor, view your test results, renew your prescriptions, schedule appointments, and more. How Do I Sign Up? 1. In your internet browser, go to [email or web address]. 2. Click on the First Time User? Click Here link in the Sign In box. You will see the New Member Sign Up page. 3. Enter your Webydo. Access Code exactly as it appears below. You will not need to use this code after youve completed the sign-up process. If you do not sign up before the expiration date, you must request a new code. Webydo. Access Code: [email or web address] Expires: 2020 12:14 PM (This is the date your Webydo. access code will ) 4. Enter the last four digits of your Social Security Number (xxxx) and Date of Birth (mm/dd/yyyy) as indicated and click Submit. You will be taken to the next sign-up page. 5. Create a Webydo. ID. This will be your Webydo. login ID and cannot be changed, so think of one that is secure and easy to remember. 6. Create a Webydo. password. You can change your password at any time. 7. Enter your Password Reset Question and Answer. This can be used at a later time if you forget your password. 8. Enter your e-mail address. You will receive e-mail notification when new information is available in [address] 9. Click Sign Up. You can now view and download portions of your medical record. 10. Click the Download Summary menu link to download a portable copy of your medical information. Additional Information If you have questions, please visit the Frequently Asked Questions section of the Webydo. website at [email or web address] Remember, Webydo. is NOT to be used for urgent needs. For medical emergencies, dial 911.

## 2020-06-04 NOTE — LETTER
Adore Jen 1960 Dear Bria Mansfield MD 
 
I had the pleasure of evaluating  Ms. Guy Gaucher in office today. Below are the relevant portions of my assessment and plan of care. ICD-10-CM ICD-9-CM 1. Essential hypertension I10 401.9 Stable continue current treatment 2. Asymptomatic HIV infection ([facility] Z21 V08 Stable. Cardiac status stable. Current Outpatient Medications Medication Sig Dispense Refill  Blood Pressure Monitor (Blood Pressure Kit) kit 1 Device by Does Not Apply route two (2) times a day. 1 Kit 0  
 GREEN TEA EXTRACT PO Take  by mouth.  abacavir-dolutegravir-lamiVUDine (TRIUMEQ) tablet Take  by mouth daily.  amLODIPine (NORVASC) 5 mg tablet Take 5 mg by mouth daily.  ACETAMINOPHEN (TYLENOL EXTRA STRENGTH PO) Take  by mouth.  aspirin delayed-release (ASPIR-81) 81 mg tablet Take  by mouth daily. Orders Placed This Encounter  Blood Pressure Monitor (Blood Pressure Kit) kit Si Device by Does Not Apply route two (2) times a day. Dispense:  1 Kit Refill:  0 If you have questions, please do not hesitate to call me. I look forward to following Ms. Jacome along with you. Sincerely, Daisha Oconnor MD

## 2020-06-04 NOTE — PROGRESS NOTES
1. Have you been to the ER, urgent care clinic since your last visit? Hospitalized since your last visit? No    2. Have you seen or consulted any other health care providers outside of the [address] [place] [address] since your last visit? Include any pap smears or colon screening.  yes

## 2021-06-17 ENCOUNTER — OFFICE VISIT (OUTPATIENT)
Dept: CARDIOLOGY CLINIC | Age: 61
End: 2021-06-17
Payer: MEDICAID

## 2021-06-17 VITALS
SYSTOLIC BLOOD PRESSURE: 133 MMHG | WEIGHT: 143 LBS | DIASTOLIC BLOOD PRESSURE: 85 MMHG | HEART RATE: 72 BPM | BODY MASS INDEX: 28.07 KG/M2 | HEIGHT: 60 IN

## 2021-06-17 DIAGNOSIS — Z21 ASYMPTOMATIC HIV INFECTION (HCC): ICD-10-CM

## 2021-06-17 DIAGNOSIS — R06.02 SHORTNESS OF BREATH: ICD-10-CM

## 2021-06-17 DIAGNOSIS — I10 ESSENTIAL HYPERTENSION: Primary | ICD-10-CM

## 2021-06-17 PROCEDURE — 99214 OFFICE O/P EST MOD 30 MIN: CPT | Performed by: INTERNAL MEDICINE

## 2021-06-17 RX ORDER — BISMUTH SUBSALICYLATE 262 MG
1 TABLET,CHEWABLE ORAL DAILY
COMMUNITY

## 2021-06-17 RX ORDER — POLYETHYLENE GLYCOL 400 AND PROPYLENE GLYCOL 4; 3 MG/ML; MG/ML
SOLUTION/ DROPS OPHTHALMIC AS NEEDED
COMMUNITY

## 2021-06-17 NOTE — PROGRESS NOTES
HISTORY OF PRESENT ILLNESS  Lubna Cevallos is a 56 y.o. female. 6/2021  Patient is here for follow-up. Patient shortness of breath on exertion. Denies any chest pain    Hypertension  The history is provided by the patient. This is a chronic problem. The problem occurs constantly. The problem has not changed since onset. Associated symptoms include shortness of breath. Pertinent negatives include no chest pain, no abdominal pain and no headaches. Review of Systems   Constitutional: Negative for chills and fever. HENT: Negative for nosebleeds. Eyes: Negative for blurred vision and double vision. Respiratory: Positive for shortness of breath. Negative for cough, hemoptysis, sputum production and wheezing. Cardiovascular: Negative for chest pain, palpitations, orthopnea, claudication, leg swelling and PND. Gastrointestinal: Negative for abdominal pain, heartburn, nausea and vomiting. Musculoskeletal: Negative for myalgias. Skin: Negative for rash. Neurological: Negative for dizziness, weakness and headaches. Endo/Heme/Allergies: Does not bruise/bleed easily.      Family History   Problem Relation Age of Onset    Heart Disease Other         Positive family history of ischemic heart disease    Heart Surgery Neg Hx     Heart Attack Neg Hx        Past Medical History:   Diagnosis Date    Chest pain, unspecified     Likely GERD, noncardiac, normal nuc scan in past    Chest pain, unspecified 2/20/2014    likely non cardiac  negative maximal est monitor clinically     GERD (gastroesophageal reflux disease)     HIV [facility]human immunodeficiency virus infection) ([facility] 2/20/2014    [facility] immunodeficiency virus ([facility]) ([facility]     Mitral valve disorders(424.0)     No MVP reported on echo: 11/2008, trace MR    Multiple lesions of mitral and aortic valve     Valve lesions    Other and unspecified hyperlipidemia     Palpitations 12/12/2014    occasional episodes more with caffein discussed diet     Shortness of breath 12/12/2014    exertional r/o cmp        Past Surgical History:   Procedure Laterality Date    HX BREAST REDUCTION      HX OTHER SURGICAL      esophageal dilatation       Social History     Tobacco Use    Smoking status: Never Smoker    Smokeless tobacco: Never Used   Substance Use Topics    Alcohol use: No       Allergies   Allergen Reactions    Latex Rash    Penicillins Not Reported This Time     Penicillin G Sodium *PENICILLINS*    Shellfish Derived Hives and Itching           Visit Vitals  /85   Pulse 72   Ht 5' (1.524 m)   Wt 64.9 kg (143 lb)   BMI 27.93 kg/m²       Physical Exam  Constitutional:       Appearance: She is well-developed. HENT:      Head: Normocephalic and atraumatic. Eyes:      Conjunctiva/sclera: Conjunctivae normal.   Neck:      Thyroid: No thyromegaly. Vascular: No JVD. Trachea: No tracheal deviation. Cardiovascular:      Rate and Rhythm: Normal rate and regular rhythm. Chest Wall: PMI is not displaced. Pulses: No decreased pulses. Heart sounds: No murmur heard. No gallop. No S3 sounds. Pulmonary:      Effort: No respiratory distress. Breath sounds: No wheezing or rales. Chest:      Chest wall: No tenderness. Abdominal:      Palpations: Abdomen is soft. Tenderness: There is no abdominal tenderness. Musculoskeletal:      Cervical back: Neck supple. Skin:     General: Skin is warm. Neurological:      Mental Status: She is alert and oriented to person, place, and time. Ms. Patsy Levine has a reminder for a \"due or due soon\" health maintenance. I have asked that she contact her primary care provider for follow-up on this health maintenance. No flowsheet data found. SUMMARY:echo:12/2014  Left ventricle: Systolic function was normal. Ejection fraction was  estimated in the range of 55 % to 60 %. There were no regional wall motion  abnormalities.     Mitral valve: No echocardiographic evidence for prolapse. There was  trivial regurgitation. Tricuspid valve: Insufficient tricuspid regurgitation to estimate  pulmonary artery pressure. Conclusion: 12/2014  1. Negative ischemic ST-T changes at 85% of predicted maximal heart rate. 2. Normal functional capacity. 3. Appropriate heart rate and blood pressure response. 4. Essentially normal stress echo findings but this should be called inconclusive as she did not maintain a very high heart rate during the post-stress echo images. 5. Clinical correlation is suggested. I have personally reviewed patient's records available from hospital and other providers and incorporated findings in patient care. 4/2016  Er note,lab,ekg    Assessment         ICD-10-CM ICD-9-CM    1. Essential hypertension  I10 401.9 ECHO ADULT COMPLETE    Blood pressure is controlled continue current medical management   2. [facility]  Z21 V08 ECHO ADULT COMPLETE    Currently on treatment stable   3. Shortness of breath  R06.02 786.05 ECHO ADULT COMPLETE    Stable continue treatment monitor follow-up echo     Will monitor clinically as chest pain atypical and stable now  2/2019  Cardiac status stable. Blood pressure elevated here in office initially. Normal on recheck. Will check at home and decide on medication change currently taking amlodipine 5 mg a day  6/2021  Shortness of breath on exertion. History of HIV and hypertension follow-up echo for LV function. Continue other treatment blood pressure controlled        There are no discontinued medications. Orders Placed This Encounter    ECHO ADULT COMPLETE     Standing Status:   Future     Standing Expiration Date:   6/17/2022     Order Specific Question:   Contrast Enhancement (Bubble Study, Definity, Optison) may be used if criteria listed in established evidence-based protocol has been identified.      Answer:   Yes       Follow-up and Dispositions    · Return for F/u after tests, Follow-up with Gianni.

## 2021-06-17 NOTE — PROGRESS NOTES
1. Have you been to the ER, urgent care clinic since your last visit? Hospitalized since your last visit? Yes michael  2. Have you seen or consulted any other health care providers outside of the [address] [place] [address] since your last visit? Include any pap smears or colon screening.       Yes Where: Dr. Ez South

## 2021-08-24 ENCOUNTER — OFFICE VISIT (OUTPATIENT)
Dept: CARDIOLOGY CLINIC | Age: 61
End: 2021-08-24
Payer: MEDICAID

## 2021-08-24 VITALS
HEART RATE: 76 BPM | HEIGHT: 60 IN | DIASTOLIC BLOOD PRESSURE: 79 MMHG | WEIGHT: 140 LBS | BODY MASS INDEX: 27.48 KG/M2 | SYSTOLIC BLOOD PRESSURE: 131 MMHG

## 2021-08-24 DIAGNOSIS — R06.02 SHORTNESS OF BREATH: ICD-10-CM

## 2021-08-24 DIAGNOSIS — I10 ESSENTIAL HYPERTENSION: Primary | ICD-10-CM

## 2021-08-24 DIAGNOSIS — Z21 ASYMPTOMATIC HIV INFECTION (HCC): ICD-10-CM

## 2021-08-24 PROCEDURE — 99214 OFFICE O/P EST MOD 30 MIN: CPT | Performed by: NURSE PRACTITIONER

## 2021-08-24 NOTE — PROGRESS NOTES
1. Have you been to the ER, urgent care clinic since your last visit? Hospitalized since your last visit?     no  2. Have you seen or consulted any other health care providers outside of the [address] [place] [address] since your last visit? Include any pap smears or colon screening.       No

## 2021-08-24 NOTE — PROGRESS NOTES
HISTORY OF PRESENT ILLNESS  Josep Cabrera is a 59 y.o. female. 6/2021  Patient is here for follow-up. Patient shortness of breath on exertion. Denies any chest pain  8/2021  Patient presents to f/u for echocardiogram and lab results. She denies chest pain, shortness of breath, palpitations or edema. Hypertension  The history is provided by the patient. This is a chronic problem. The problem occurs constantly. The problem has not changed since onset. Pertinent negatives include no chest pain, no abdominal pain, no headaches and no shortness of breath. Review of Systems   Constitutional: Negative for chills and fever. HENT: Negative for nosebleeds. Eyes: Negative for blurred vision and double vision. Respiratory: Negative for cough, hemoptysis, sputum production, shortness of breath and wheezing. Cardiovascular: Negative for chest pain, palpitations, orthopnea, claudication, leg swelling and PND. Gastrointestinal: Negative for abdominal pain, heartburn, nausea and vomiting. Musculoskeletal: Negative for myalgias. Skin: Negative for rash. Neurological: Negative for dizziness, weakness and headaches. Endo/Heme/Allergies: Does not bruise/bleed easily.      Family History   Problem Relation Age of Onset    Heart Disease Other         Positive family history of ischemic heart disease    Heart Surgery Neg Hx     Heart Attack Neg Hx        Past Medical History:   Diagnosis Date    Chest pain, unspecified     Likely GERD, noncardiac, normal nuc scan in past    Chest pain, unspecified 2/20/2014    likely non cardiac  negative maximal est monitor clinically     GERD (gastroesophageal reflux disease)     [facility]human immunodeficiency virus infection) [facility] 2/20/2014    [facility] immunodeficiency virus ([facility]) [facility]     Mitral valve disorders(424.0)     No MVP reported on echo: 11/2008, trace MR    Multiple lesions of mitral and aortic valve     Valve lesions    Other and unspecified hyperlipidemia     Palpitations 12/12/2014    occasional episodes more with caffein discussed diet     Shortness of breath 12/12/2014    exertional r/o cmp        Past Surgical History:   Procedure Laterality Date    HX BREAST REDUCTION      HX OTHER SURGICAL      esophageal dilatation       Social History     Tobacco Use    Smoking status: Never Smoker    Smokeless tobacco: Never Used   Substance Use Topics    Alcohol use: No       Allergies   Allergen Reactions    Latex Rash    Penicillins Not Reported This Time     Penicillin G Sodium *PENICILLINS*    Shellfish Derived Hives and Itching           Visit Vitals  /79   Pulse 76   Ht 5' (1.524 m)   Wt 63.5 kg (140 lb)   BMI 27.34 kg/m²       Physical Exam  Vitals and nursing note reviewed. Constitutional:       Appearance: She is well-developed. HENT:      Head: Normocephalic and atraumatic. Eyes:      Conjunctiva/sclera: Conjunctivae normal.   Neck:      Thyroid: No thyromegaly. Vascular: No JVD. Trachea: No tracheal deviation. Cardiovascular:      Rate and Rhythm: Normal rate and regular rhythm. Chest Wall: PMI is not displaced. Pulses: No decreased pulses. Heart sounds: No murmur heard. No gallop. No S3 sounds. Pulmonary:      Effort: No respiratory distress. Breath sounds: No wheezing or rales. Chest:      Chest wall: No tenderness. Abdominal:      Palpations: Abdomen is soft. Tenderness: There is no abdominal tenderness. Musculoskeletal:      Cervical back: Neck supple. Right lower leg: No edema. Left lower leg: No edema. Skin:     General: Skin is warm. Neurological:      Mental Status: She is alert and oriented to person, place, and time. Ms. Jamaica Erickson has a reminder for a \"due or due soon\" health maintenance. I have asked that she contact her primary care provider for follow-up on this health maintenance.     No flowsheet data found.  SUMMARY:echo:12/2014  Left ventricle: Systolic function was normal. Ejection fraction was  estimated in the range of 55 % to 60 %. There were no regional wall motion  abnormalities. Mitral valve: No echocardiographic evidence for prolapse. There was  trivial regurgitation. Tricuspid valve: Insufficient tricuspid regurgitation to estimate  pulmonary artery pressure. Conclusion: 12/2014  1. Negative ischemic ST-T changes at 85% of predicted maximal heart rate. 2. Normal functional capacity. 3. Appropriate heart rate and blood pressure response. 4. Essentially normal stress echo findings but this should be called inconclusive as she did not maintain a very high heart rate during the post-stress echo images. 5. Clinical correlation is suggested. 8/2021 Echo   Interpretation Summary  · LV: Calculated LVEF is 55%. Normal cavity size, wall thickness and systolic function (ejection fraction normal). Wall motion: normal. Mild (grade 1) left ventricular diastolic dysfunction. · RV: Normal right ventricular size and function. · TV: Right Ventricular Arterial Pressure (RVSP) is 19 mmHg. Pulmonary hypertension not suggested by Doppler findings. · No hemodynamically significant valvular pathology. Comparison Study Information  Prior Study  There is a prior study available for comparison. Prior study date: 12/22/2014. As compared to the previous study, there are no significant changes. I have personally reviewed patient's records available from hospital and other providers and incorporated findings in patient care. 4/2016  Er note,lab,ekg    Assessment         ICD-10-CM ICD-9-CM    1. Essential hypertension  I10 401.9     Blood pressure is controlled continue current medical management   2. [facility]  Z21 V08     Currently on treatment, stable   3.  Shortness of breath  R06.02 786.05     Stable continue treatment monitor follow-up echo     Will monitor clinically as chest pain atypical and stable now  2/2019  Cardiac status stable. Blood pressure elevated here in office initially. Normal on recheck. Will check at home and decide on medication change currently taking amlodipine 5 mg a day  6/2021  Shortness of breath on exertion. History of HIV and hypertension follow-up echo for LV function. Continue other treatment blood pressure controlled  8/2021  Reports dyspnea on exertion has resolved. Echo reviewed and discussed with patient. Normal LV function, no significant valvular pathology. Blood pressure is controlled, continue current medications. There are no discontinued medications. No orders of the defined types were placed in this encounter. Follow-up and Dispositions    · Return in about 6 months (around 2/24/2022) for Follow up with Dr. Ez Del Rosario.

## 2021-08-24 NOTE — PATIENT INSTRUCTIONS
Heart-Healthy Diet: Care Instructions  Your Care Instructions     A heart-healthy diet has lots of vegetables, fruits, nuts, beans, and whole grains, and is low in salt. It limits foods that are high in saturated fat, such as meats, cheeses, and fried foods. It may be hard to change your diet, but even small changes can lower your risk of heart attack and heart disease. Follow-up care is a key part of your treatment and safety. Be sure to make and go to all appointments, and call your doctor if you are having problems. It's also a good idea to know your test results and keep a list of the medicines you take. How can you care for yourself at home? Watch your portions  · Use food labels to learn what the recommended servings are for the foods you eat. · Eat only the number of calories you need to stay at a healthy weight. If you need to lose weight, eat fewer calories than your body burns (through exercise and other physical activity). Eat more fruits and vegetables  · Eat a variety of fruit and vegetables every day. Dark green, deep orange, red, or yellow fruits and vegetables are especially good for you. Examples include spinach, carrots, peaches, and berries. · Keep carrots, celery, and other veggies handy for snacks. Buy fruit that is in season and store it where you can see it so that you will be tempted to eat it. · Cook dishes that have a lot of veggies in them, such as stir-fries and soups. Limit saturated fat  · Read food labels, and try to avoid saturated fats. They increase your risk of heart disease. · Use olive or canola oil when you cook. · Bake, broil, grill, or steam foods instead of frying them. · Choose lean meats instead of high-fat meats such as hot dogs and sausages. Cut off all visible fat when you prepare meat. · Eat fish, skinless poultry, and meat alternatives such as soy products instead of high-fat meats.  Soy products, such as tofu, may be especially good for your heart.  · Choose low-fat or fat-free milk and dairy products. Eat foods high in fiber  · Eat a variety of grain products every day. Include whole-grain foods that have lots of fiber and nutrients. Examples of whole-grain foods include oats, whole wheat bread, and brown rice. · Buy whole-grain breads and cereals, instead of white bread or pastries. Limit salt and sodium  · Limit how much salt and sodium you eat to help lower your blood pressure. · Taste food before you salt it. Add only a little salt when you think you need it. With time, your taste buds will adjust to less salt. · Eat fewer snack items, fast foods, and other high-salt, processed foods. Check food labels for the amount of sodium in packaged foods. · Choose low-sodium versions of canned goods (such as soups, vegetables, and beans). Limit sugar  · Limit drinks and foods with added sugar. These include candy, desserts, and soda pop. Limit alcohol  · Limit alcohol to no more than 2 drinks a day for men and 1 drink a day for women. Too much alcohol can cause health problems. When should you call for help? Watch closely for changes in your health, and be sure to contact your doctor if:    · You would like help planning heart-healthy meals. Where can you learn more? Go to http://www.collier.com/  Enter V137 in the search box to learn more about \"Heart-Healthy Diet: Care Instructions. \"  Current as of: December 17, 2020               Content Version: 12.8  © 2006-2021 [employer]. Care instructions adapted under license by [employer] (which disclaims liability or warranty for this information). If you have questions about a medical condition or this instruction, always ask your healthcare professional. [place] [address] any warranty or liability for your use of this information.

## 2022-02-08 ENCOUNTER — OFFICE VISIT (OUTPATIENT)
Dept: CARDIOLOGY CLINIC | Age: 62
End: 2022-02-08
Payer: MEDICAID

## 2022-02-08 VITALS
DIASTOLIC BLOOD PRESSURE: 74 MMHG | BODY MASS INDEX: 27.29 KG/M2 | HEART RATE: 71 BPM | HEIGHT: 60 IN | WEIGHT: 139 LBS | SYSTOLIC BLOOD PRESSURE: 130 MMHG

## 2022-02-08 DIAGNOSIS — R06.02 SHORTNESS OF BREATH: ICD-10-CM

## 2022-02-08 DIAGNOSIS — Z21 ASYMPTOMATIC HIV INFECTION (HCC): ICD-10-CM

## 2022-02-08 DIAGNOSIS — I10 ESSENTIAL HYPERTENSION: Primary | ICD-10-CM

## 2022-02-08 PROCEDURE — 99213 OFFICE O/P EST LOW 20 MIN: CPT | Performed by: INTERNAL MEDICINE

## 2022-02-08 NOTE — PROGRESS NOTES
1. Have you been to the ER, urgent care clinic since your last visit? Hospitalized since your last visit? Yes, Urgent care    2. Have you seen or consulted any other health care providers outside of the [address] [place] [address] since your last visit? Include any pap smears or colon screening.  No

## 2022-02-08 NOTE — PROGRESS NOTES
HISTORY OF PRESENT ILLNESS  Josep Larry is a 59 y.o. female. 6/2021  Patient is here for follow-up. Patient shortness of breath on exertion. Denies any chest pain  8/2021  Patient presents to f/u for echocardiogram and lab results. She denies chest pain, shortness of breath, palpitations or edema. Hypertension  The history is provided by the patient. This is a chronic problem. The problem occurs constantly. The problem has not changed since onset. Pertinent negatives include no chest pain, no abdominal pain, no headaches and no shortness of breath. Review of Systems   Constitutional: Negative for chills and fever. HENT: Negative for nosebleeds. Eyes: Negative for blurred vision and double vision. Respiratory: Negative for cough, hemoptysis, sputum production, shortness of breath and wheezing. Cardiovascular: Negative for chest pain, palpitations, orthopnea, claudication, leg swelling and PND. Gastrointestinal: Negative for abdominal pain, heartburn, nausea and vomiting. Musculoskeletal: Negative for myalgias. Skin: Negative for rash. Neurological: Negative for dizziness, weakness and headaches. Endo/Heme/Allergies: Does not bruise/bleed easily.      Family History   Problem Relation Age of Onset    Heart Disease Other         Positive family history of ischemic heart disease    Heart Surgery Neg Hx     Heart Attack Neg Hx        Past Medical History:   Diagnosis Date    Chest pain, unspecified     Likely GERD, noncardiac, normal nuc scan in past    Chest pain, unspecified 2/20/2014    likely non cardiac  negative maximal est monitor clinically     GERD (gastroesophageal reflux disease)     HIV (human immunodeficiency virus infection) ([facility] 2/20/2014    Human immunodeficiency virus ([facility]) ([facility]     Mitral valve disorders(424.0)     No MVP reported on echo: 11/2008, trace MR    Multiple lesions of mitral and aortic valve     Valve lesions    Other and unspecified hyperlipidemia     Palpitations 12/12/2014    occasional episodes more with caffein discussed diet     Shortness of breath 12/12/2014    exertional r/o cmp        Past Surgical History:   Procedure Laterality Date    HX BREAST REDUCTION      HX OTHER SURGICAL      esophageal dilatation       Social History     Tobacco Use    Smoking status: Never Smoker    Smokeless tobacco: Never Used   Substance Use Topics    Alcohol use: No       Allergies   Allergen Reactions    Latex Rash    Penicillins Not Reported This Time     Penicillin G Sodium *PENICILLINS*    Shellfish Derived Hives and Itching           Visit Vitals  /74 (BP 1 Location: Left upper arm, BP Patient Position: Sitting, BP Cuff Size: Adult)   Pulse 71   Ht 5' (1.524 m)   Wt 63 kg (139 lb)   BMI 27.15 kg/m²       Physical Exam  Vitals and nursing note reviewed. Constitutional:       Appearance: She is well-developed. HENT:      Head: Normocephalic and atraumatic. Eyes:      Conjunctiva/sclera: Conjunctivae normal.   Neck:      Thyroid: No thyromegaly. Vascular: No JVD. Trachea: No tracheal deviation. Cardiovascular:      Rate and Rhythm: Normal rate and regular rhythm. Chest Wall: PMI is not displaced. Pulses: No decreased pulses. Heart sounds: No murmur heard. No gallop. No S3 sounds. Pulmonary:      Effort: No respiratory distress. Breath sounds: No wheezing or rales. Chest:      Chest wall: No tenderness. Abdominal:      Palpations: Abdomen is soft. Tenderness: There is no abdominal tenderness. Musculoskeletal:      Cervical back: Neck supple. Right lower leg: No edema. Left lower leg: No edema. Skin:     General: Skin is warm. Neurological:      Mental Status: She is alert and oriented to person, place, and time. Ms. Donia Lennox has a reminder for a \"due or due soon\" health maintenance.  I have asked that she contact her primary care provider for follow-up on this health maintenance. No flowsheet data found. SUMMARY:echo:12/2014  Left ventricle: Systolic function was normal. Ejection fraction was  estimated in the range of 55 % to 60 %. There were no regional wall motion  abnormalities. Mitral valve: No echocardiographic evidence for prolapse. There was  trivial regurgitation. Tricuspid valve: Insufficient tricuspid regurgitation to estimate  pulmonary artery pressure. Conclusion: 12/2014  1. Negative ischemic ST-T changes at 85% of predicted maximal heart rate. 2. Normal functional capacity. 3. Appropriate heart rate and blood pressure response. 4. Essentially normal stress echo findings but this should be called inconclusive as she did not maintain a very high heart rate during the post-stress echo images. 5. Clinical correlation is suggested. 8/2021 Echo   Interpretation Summary  · LV: Calculated LVEF is 55%. Normal cavity size, wall thickness and systolic function (ejection fraction normal). Wall motion: normal. Mild (grade 1) left ventricular diastolic dysfunction. · RV: Normal right ventricular size and function. · TV: Right Ventricular Arterial Pressure (RVSP) is 19 mmHg. Pulmonary hypertension not suggested by Doppler findings. · No hemodynamically significant valvular pathology. Comparison Study Information  Prior Study  There is a prior study available for comparison. Prior study date: 12/22/2014. As compared to the previous study, there are no significant changes. I have personally reviewed patient's records available from hospital and other providers and incorporated findings in patient care. 4/2016  Er note,lab,ekg    Assessment         ICD-10-CM ICD-9-CM    1. Essential hypertension  I10 401.9     Stable monitor continue treatment   2. [facility]  Z21 V08     Continue treatment per ID direction has been done   3.  Shortness of breath  R06.02 786.05     Stable symptom monitor     Will monitor clinically as chest pain atypical and stable now  2/2019  Cardiac status stable. Blood pressure elevated here in office initially. Normal on recheck. Will check at home and decide on medication change currently taking amlodipine 5 mg a day  6/2021  Shortness of breath on exertion. History of HIV and hypertension follow-up echo for LV function. Continue other treatment blood pressure controlled  8/2021  Reports dyspnea on exertion has resolved. Echo reviewed and discussed with patient. Normal LV function, no significant valvular pathology. Blood pressure is controlled, continue current medications. 2/2022  Cardiac status stable. Blood pressure controlled. Shortness of breath stable. There are no discontinued medications. No orders of the defined types were placed in this encounter. Follow-up and Dispositions    · Return in about 1 year (around 2/8/2023).

## 2023-02-23 ENCOUNTER — OFFICE VISIT (OUTPATIENT)
Age: 63
End: 2023-02-23
Payer: MEDICAID

## 2023-02-23 VITALS
WEIGHT: 142 LBS | BODY MASS INDEX: 27.88 KG/M2 | OXYGEN SATURATION: 98 % | HEIGHT: 60 IN | DIASTOLIC BLOOD PRESSURE: 84 MMHG | HEART RATE: 82 BPM | SYSTOLIC BLOOD PRESSURE: 152 MMHG

## 2023-02-23 DIAGNOSIS — Z21 ASYMPTOMATIC HUMAN IMMUNODEFICIENCY VIRUS (HIV) INFECTION STATUS (HCC): ICD-10-CM

## 2023-02-23 DIAGNOSIS — I10 ESSENTIAL (PRIMARY) HYPERTENSION: Primary | ICD-10-CM

## 2023-02-23 DIAGNOSIS — R06.02 SHORTNESS OF BREATH: ICD-10-CM

## 2023-02-23 PROCEDURE — 99213 OFFICE O/P EST LOW 20 MIN: CPT | Performed by: INTERNAL MEDICINE

## 2023-02-23 PROCEDURE — 3077F SYST BP >= 140 MM HG: CPT | Performed by: INTERNAL MEDICINE

## 2023-02-23 PROCEDURE — 3079F DIAST BP 80-89 MM HG: CPT | Performed by: INTERNAL MEDICINE

## 2023-02-23 ASSESSMENT — PATIENT HEALTH QUESTIONNAIRE - PHQ9
SUM OF ALL RESPONSES TO PHQ9 QUESTIONS 1 & 2: 0
1. LITTLE INTEREST OR PLEASURE IN DOING THINGS: 0
SUM OF ALL RESPONSES TO PHQ QUESTIONS 1-9: 0
2. FEELING DOWN, DEPRESSED OR HOPELESS: 0
SUM OF ALL RESPONSES TO PHQ QUESTIONS 1-9: 0
SUM OF ALL RESPONSES TO PHQ QUESTIONS 1-9: 0
DEPRESSION UNABLE TO ASSESS: FUNCTIONAL CAPACITY MOTIVATION LIMITS ACCURACY
SUM OF ALL RESPONSES TO PHQ QUESTIONS 1-9: 0

## 2023-02-23 NOTE — PROGRESS NOTES
HISTORY OF PRESENT ILLNESS  Emma Ureña is a 59 y.o. female. 6/2021  Patient is here for follow-up. Patient shortness of breath on exertion. Denies any chest pain  8/2021  Patient presents to f/u for echocardiogram and lab results. She denies chest pain, shortness of breath, palpitations or edema. Hypertension  The history is provided by the patient. This is a chronic problem. The problem occurs constantly. The problem has not changed since onset. Pertinent negatives include no chest pain, no abdominal pain, no headaches and no shortness of breath. Review of Systems   Constitutional: Negative for chills and fever. HENT: Negative for nosebleeds. Eyes: Negative for blurred vision and double vision. Respiratory: Negative for cough, hemoptysis, sputum production, shortness of breath and wheezing. Cardiovascular: Negative for chest pain, palpitations, orthopnea, claudication, leg swelling and PND. Gastrointestinal: Negative for abdominal pain, heartburn, nausea and vomiting. Musculoskeletal: Negative for myalgias. Skin: Negative for rash. Neurological: Negative for dizziness, weakness and headaches. Endo/Heme/Allergies: Does not bruise/bleed easily.      Family History   Problem Relation Age of Onset    Heart Surgery Neg Hx     Heart Disease Other         Positive family history of ischemic heart disease    Heart Attack Neg Hx        Past Medical History:   Diagnosis Date    Chest pain, unspecified 2/20/2014    likely non cardiac  negative maximal est monitor clinically     Chest pain, unspecified     Likely GERD, noncardiac, normal nuc scan in past    GERD (gastroesophageal reflux disease)     HIV (human immunodeficiency virus infection) ([facility] [address] 2/20/2014    Human immunodeficiency virus (HIV) ([facility] [address]     Mitral valve disorders(424.0)     No MVP reported on echo: 11/2008, trace MR    Multiple lesions of mitral and aortic valve     Valve lesions    Other and unspecified hyperlipidemia     Palpitations 12/12/2014    occasional episodes more with caffein discussed diet     Shortness of breath 12/12/2014    exertional r/o cmp        Past Surgical History:   Procedure Laterality Date    BREAST REDUCTION SURGERY      OTHER SURGICAL HISTORY      esophageal dilatation       Social History     Tobacco Use    Smoking status: Never    Smokeless tobacco: Never   Substance Use Topics    Alcohol use: No       Allergies   Allergen Reactions    Latex Rash    Penicillins      Other reaction(s): Not Reported This Time  Penicillin G Sodium *PENICILLINS*    Shellfish Allergy Hives and Itching       Prior to Admission medications    Medication Sig Start Date End Date Taking? Authorizing Provider   Green Tea, Sadie sinensis, (GREEN TEA EXTRACT PO) Take by mouth   Yes Ar Automatic Reconciliation   Abacavir-Dolutegravir-Lamivud 600- MG TABS Take by mouth daily   Yes Ar Automatic Reconciliation   amLODIPine (NORVASC) 5 MG tablet Take 5 mg by mouth daily   Yes Ar Automatic Reconciliation   aspirin 81 MG EC tablet Take by mouth as needed 11/6/08  Yes Ar Automatic Reconciliation         BP (!) 152/84 (Site: Left Upper Arm, Position: Sitting, Cuff Size: Medium Adult)   Pulse 82   Ht 5' (1.524 m)   Wt 142 lb (64.4 kg)   SpO2 98%   BMI 27.73 kg/m²     Physical Exam  Vitals and nursing note reviewed.   Constitutional:       Appearance: She is well-developed.   HENT:      Head: Normocephalic and atraumatic.   Eyes:      Conjunctiva/sclera: Conjunctivae normal.   Neck:      Thyroid: No thyromegaly.      Vascular: No JVD.      Trachea: No tracheal deviation.   Cardiovascular:      Rate and Rhythm: Normal rate and regular rhythm.      Chest Wall: PMI is not displaced.      Pulses: No decreased pulses.      Heart sounds: No murmur heard.  No gallop. No S3 sounds.    Pulmonary:      Effort: No respiratory distress.      Breath sounds: No wheezing or rales.   Chest:      Chest wall: No tenderness.  Abdominal:      Palpations: Abdomen is soft. Tenderness: There is no abdominal tenderness. Musculoskeletal:      Cervical back: Neck supple. Right lower leg: No edema. Left lower leg: No edema. Skin:     General: Skin is warm. Neurological:      Mental Status: She is alert and oriented to person, place, and time. Ms. Vinay Mancilla has a reminder for a \"due or due soon\" health maintenance. I have asked that she contact her primary care provider for follow-up on this health maintenance. No flowsheet data found. SUMMARY:echo:12/2014  Left ventricle: Systolic function was normal. Ejection fraction was  estimated in the range of 55 % to 60 %. There were no regional wall motion  abnormalities. Mitral valve: No echocardiographic evidence for prolapse. There was  trivial regurgitation. Tricuspid valve: Insufficient tricuspid regurgitation to estimate  pulmonary artery pressure. Conclusion: 12/2014  Negative ischemic ST-T changes at 85% of predicted maximal heart rate. Normal functional capacity. Appropriate heart rate and blood pressure response. Essentially normal stress echo findings but this should be called inconclusive as she did not maintain a very high heart rate during the post-stress echo images. Clinical correlation is suggested. 8/2021 Echo   Interpretation Summary  LV: Calculated LVEF is 55%. Normal cavity size, wall thickness and systolic function (ejection fraction normal). Wall motion: normal. Mild (grade 1) left ventricular diastolic dysfunction. RV: Normal right ventricular size and function. TV: Right Ventricular Arterial Pressure (RVSP) is 19 mmHg. Pulmonary hypertension not suggested by Doppler findings. No hemodynamically significant valvular pathology. Comparison Study Information  Prior Study  There is a prior study available for comparison. Prior study date: 12/22/2014.  As compared to the previous study, there are no significant changes. I have personally reviewed patient's records available from hospital and other providers and incorporated findings in patient care. 4/2016  Er note,lab,ekg    Assessment         ICD-10-CM ICD-9-CM    1. Essential hypertension  I10 401.9     Stable monitor continue treatment   2. Asymptomatic HIV infection ([facility] [address]  Z21 V08     Continue treatment per ID direction has been done   3. Shortness of breath  R06.02 786.05     Stable symptom monitor     Will monitor clinically as chest pain atypical and stable now  2/2019  Cardiac status stable. Blood pressure elevated here in office initially. Normal on recheck. Will check at home and decide on medication change currently taking amlodipine 5 mg a day  6/2021  Shortness of breath on exertion. History of HIV and hypertension follow-up echo for LV function. Continue other treatment blood pressure controlled  8/2021  Reports dyspnea on exertion has resolved. Echo reviewed and discussed with patient. Normal LV function, no significant valvular pathology. Blood pressure is controlled, continue current medications. 2/2022  Cardiac status stable. Blood pressure controlled. Shortness of breath stable. 2/2023  Cardiac status stable continue current medical management monitor  Blood pressure elevated here. Usually runs normal will continue to monitor. Currently continue with dietary modification and salt restriction.

## 2023-08-22 ENCOUNTER — APPOINTMENT (OUTPATIENT)
Facility: HOSPITAL | Age: 63
End: 2023-08-22
Payer: MEDICAID

## 2023-08-22 ENCOUNTER — HOSPITAL ENCOUNTER (INPATIENT)
Facility: HOSPITAL | Age: 63
LOS: 3 days | Discharge: HOME HEALTH | End: 2023-08-25
Attending: EMERGENCY MEDICINE | Admitting: SURGERY
Payer: MEDICAID

## 2023-08-22 DIAGNOSIS — S22.41XD CLOSED FRACTURE OF MULTIPLE RIBS OF RIGHT SIDE WITH ROUTINE HEALING: ICD-10-CM

## 2023-08-22 DIAGNOSIS — S22.41XA CLOSED FRACTURE OF MULTIPLE RIBS OF RIGHT SIDE, INITIAL ENCOUNTER: Primary | ICD-10-CM

## 2023-08-22 PROBLEM — S22.49XA: Status: ACTIVE | Noted: 2023-08-22

## 2023-08-22 PROBLEM — S42.302A: Status: ACTIVE | Noted: 2023-08-22

## 2023-08-22 PROBLEM — S42.301A: Status: ACTIVE | Noted: 2023-08-22

## 2023-08-22 PROBLEM — S22.20XA: Status: ACTIVE | Noted: 2023-08-22

## 2023-08-22 LAB
ABO + RH BLD: NORMAL
ALBUMIN SERPL-MCNC: 2.7 G/DL (ref 3.5–5)
ALBUMIN/GLOB SERPL: 0.8 (ref 1.1–2.2)
ALP SERPL-CCNC: 68 U/L (ref 45–117)
ALT SERPL-CCNC: 42 U/L (ref 12–78)
AMPHET UR QL SCN: NEGATIVE
ANION GAP SERPL CALC-SCNC: 7 MMOL/L (ref 5–15)
APPEARANCE UR: CLEAR
AST SERPL W P-5'-P-CCNC: 69 U/L (ref 15–37)
BACTERIA URNS QL MICRO: NEGATIVE /HPF
BARBITURATES UR QL SCN: NEGATIVE
BASOPHILS # BLD: 0.1 K/UL (ref 0–0.1)
BASOPHILS NFR BLD: 0 % (ref 0–1)
BENZODIAZ UR QL: NEGATIVE
BILIRUB SERPL-MCNC: 0.3 MG/DL (ref 0.2–1)
BILIRUB UR QL: NEGATIVE
BLOOD GROUP ANTIBODIES SERPL: NEGATIVE
BUN SERPL-MCNC: 12 MG/DL (ref 6–20)
BUN/CREAT SERPL: 14 (ref 12–20)
CA-I BLD-MCNC: 8 MG/DL (ref 8.5–10.1)
CANNABINOIDS UR QL SCN: NEGATIVE
CHLORIDE SERPL-SCNC: 114 MMOL/L (ref 97–108)
CO2 SERPL-SCNC: 22 MMOL/L (ref 21–32)
COCAINE UR QL SCN: NEGATIVE
COLOR UR: ABNORMAL
CREAT SERPL-MCNC: 0.87 MG/DL (ref 0.55–1.02)
DIFFERENTIAL METHOD BLD: ABNORMAL
EKG ATRIAL RATE: 96 BPM
EKG DIAGNOSIS: NORMAL
EKG P AXIS: 58 DEGREES
EKG P-R INTERVAL: 166 MS
EKG Q-T INTERVAL: 402 MS
EKG QRS DURATION: 140 MS
EKG QTC CALCULATION (BAZETT): 507 MS
EKG R AXIS: -45 DEGREES
EKG T AXIS: 34 DEGREES
EKG VENTRICULAR RATE: 96 BPM
EOSINOPHIL # BLD: 0.1 K/UL (ref 0–0.4)
EOSINOPHIL NFR BLD: 1 % (ref 0–7)
EPITH CASTS URNS QL MICRO: ABNORMAL /LPF
ERYTHROCYTE [DISTWIDTH] IN BLOOD BY AUTOMATED COUNT: 13.8 % (ref 11.5–14.5)
ETHANOL SERPL-MCNC: <10 MG/DL (ref 0–0.08)
GLOBULIN SER CALC-MCNC: 3.3 G/DL (ref 2–4)
GLUCOSE SERPL-MCNC: 185 MG/DL (ref 65–100)
GLUCOSE UR STRIP.AUTO-MCNC: 50 MG/DL
HCT VFR BLD AUTO: 36.1 % (ref 35–47)
HGB BLD-MCNC: 11.2 G/DL (ref 11.5–16)
HGB UR QL STRIP: ABNORMAL
IMM GRANULOCYTES # BLD AUTO: 0.2 K/UL (ref 0–0.04)
IMM GRANULOCYTES NFR BLD AUTO: 1 % (ref 0–0.5)
INR PPP: 1 (ref 0.9–1.1)
KETONES UR QL STRIP.AUTO: NEGATIVE MG/DL
LEUKOCYTE ESTERASE UR QL STRIP.AUTO: NEGATIVE
LYMPHOCYTES # BLD: 1.7 K/UL (ref 0.8–3.5)
LYMPHOCYTES NFR BLD: 14 % (ref 12–49)
Lab: NORMAL
MCH RBC QN AUTO: 30.5 PG (ref 26–34)
MCHC RBC AUTO-ENTMCNC: 31 G/DL (ref 30–36.5)
MCV RBC AUTO: 98.4 FL (ref 80–99)
METHADONE UR QL: NEGATIVE
MONOCYTES # BLD: 0.6 K/UL (ref 0–1)
MONOCYTES NFR BLD: 5 % (ref 5–13)
MUCOUS THREADS URNS QL MICRO: ABNORMAL /LPF
NEUTS SEG # BLD: 9.3 K/UL (ref 1.8–8)
NEUTS SEG NFR BLD: 79 % (ref 32–75)
NITRITE UR QL STRIP.AUTO: NEGATIVE
NRBC # BLD: 0 K/UL (ref 0–0.01)
NRBC BLD-RTO: 0 PER 100 WBC
OPIATES UR QL: NEGATIVE
PCP UR QL: NEGATIVE
PH UR STRIP: 6 (ref 5–8)
PLATELET # BLD AUTO: 196 K/UL (ref 150–400)
PMV BLD AUTO: 9.9 FL (ref 8.9–12.9)
POTASSIUM SERPL-SCNC: 3.4 MMOL/L (ref 3.5–5.1)
PROT SERPL-MCNC: 6 G/DL (ref 6.4–8.2)
PROT UR STRIP-MCNC: NEGATIVE MG/DL
PROTHROMBIN TIME: 14 SEC (ref 11.9–14.6)
RBC # BLD AUTO: 3.67 M/UL (ref 3.8–5.2)
RBC #/AREA URNS HPF: ABNORMAL /HPF (ref 0–5)
SODIUM SERPL-SCNC: 143 MMOL/L (ref 136–145)
SP GR UR REFRACTOMETRY: 1.03 (ref 1–1.03)
SPECIMEN EXP DATE BLD: NORMAL
TROPONIN I SERPL HS-MCNC: 8 NG/L (ref 0–51)
UROBILINOGEN UR QL STRIP.AUTO: 0.1 EU/DL (ref 0.1–1)
WBC # BLD AUTO: 11.9 K/UL (ref 3.6–11)
WBC URNS QL MICRO: ABNORMAL /HPF (ref 0–4)

## 2023-08-22 PROCEDURE — 80307 DRUG TEST PRSMV CHEM ANLYZR: CPT

## 2023-08-22 PROCEDURE — 81001 URINALYSIS AUTO W/SCOPE: CPT

## 2023-08-22 PROCEDURE — 72125 CT NECK SPINE W/O DYE: CPT

## 2023-08-22 PROCEDURE — 85610 PROTHROMBIN TIME: CPT

## 2023-08-22 PROCEDURE — 85025 COMPLETE CBC W/AUTO DIFF WBC: CPT

## 2023-08-22 PROCEDURE — 72170 X-RAY EXAM OF PELVIS: CPT

## 2023-08-22 PROCEDURE — 6360000002 HC RX W HCPCS: Performed by: SURGERY

## 2023-08-22 PROCEDURE — 6360000004 HC RX CONTRAST MEDICATION: Performed by: EMERGENCY MEDICINE

## 2023-08-22 PROCEDURE — 86900 BLOOD TYPING SEROLOGIC ABO: CPT

## 2023-08-22 PROCEDURE — 93005 ELECTROCARDIOGRAM TRACING: CPT | Performed by: EMERGENCY MEDICINE

## 2023-08-22 PROCEDURE — 99285 EMERGENCY DEPT VISIT HI MDM: CPT

## 2023-08-22 PROCEDURE — 1100000000 HC RM PRIVATE

## 2023-08-22 PROCEDURE — 2580000003 HC RX 258: Performed by: SURGERY

## 2023-08-22 PROCEDURE — 86901 BLOOD TYPING SEROLOGIC RH(D): CPT

## 2023-08-22 PROCEDURE — 71045 X-RAY EXAM CHEST 1 VIEW: CPT

## 2023-08-22 PROCEDURE — 86850 RBC ANTIBODY SCREEN: CPT

## 2023-08-22 PROCEDURE — 36415 COLL VENOUS BLD VENIPUNCTURE: CPT

## 2023-08-22 PROCEDURE — 99283 EMERGENCY DEPT VISIT LOW MDM: CPT | Performed by: SURGERY

## 2023-08-22 PROCEDURE — 71260 CT THORAX DX C+: CPT

## 2023-08-22 PROCEDURE — 6360000002 HC RX W HCPCS: Performed by: EMERGENCY MEDICINE

## 2023-08-22 PROCEDURE — 80053 COMPREHEN METABOLIC PANEL: CPT

## 2023-08-22 PROCEDURE — 96374 THER/PROPH/DIAG INJ IV PUSH: CPT

## 2023-08-22 PROCEDURE — 6370000000 HC RX 637 (ALT 250 FOR IP): Performed by: SURGERY

## 2023-08-22 PROCEDURE — 84484 ASSAY OF TROPONIN QUANT: CPT

## 2023-08-22 PROCEDURE — 70450 CT HEAD/BRAIN W/O DYE: CPT

## 2023-08-22 PROCEDURE — 82077 ASSAY SPEC XCP UR&BREATH IA: CPT

## 2023-08-22 RX ORDER — AMLODIPINE BESYLATE 5 MG/1
5 TABLET ORAL DAILY
Status: DISCONTINUED | OUTPATIENT
Start: 2023-08-22 | End: 2023-08-25 | Stop reason: HOSPADM

## 2023-08-22 RX ORDER — ACETAMINOPHEN 325 MG/1
650 TABLET ORAL EVERY 6 HOURS
Status: DISCONTINUED | OUTPATIENT
Start: 2023-08-22 | End: 2023-08-25 | Stop reason: HOSPADM

## 2023-08-22 RX ORDER — KETOROLAC TROMETHAMINE 15 MG/ML
15 INJECTION, SOLUTION INTRAMUSCULAR; INTRAVENOUS EVERY 6 HOURS
Status: DISCONTINUED | OUTPATIENT
Start: 2023-08-22 | End: 2023-08-25 | Stop reason: HOSPADM

## 2023-08-22 RX ORDER — SODIUM CHLORIDE 9 MG/ML
INJECTION, SOLUTION INTRAVENOUS PRN
Status: DISCONTINUED | OUTPATIENT
Start: 2023-08-22 | End: 2023-08-25 | Stop reason: HOSPADM

## 2023-08-22 RX ORDER — POLYETHYLENE GLYCOL 3350 17 G/17G
17 POWDER, FOR SOLUTION ORAL DAILY
Status: DISCONTINUED | OUTPATIENT
Start: 2023-08-23 | End: 2023-08-25 | Stop reason: HOSPADM

## 2023-08-22 RX ORDER — SODIUM CHLORIDE 0.9 % (FLUSH) 0.9 %
5-40 SYRINGE (ML) INJECTION EVERY 12 HOURS SCHEDULED
Status: DISCONTINUED | OUTPATIENT
Start: 2023-08-22 | End: 2023-08-25 | Stop reason: HOSPADM

## 2023-08-22 RX ORDER — SODIUM CHLORIDE 0.9 % (FLUSH) 0.9 %
5-40 SYRINGE (ML) INJECTION PRN
Status: DISCONTINUED | OUTPATIENT
Start: 2023-08-22 | End: 2023-08-25 | Stop reason: HOSPADM

## 2023-08-22 RX ORDER — ONDANSETRON 2 MG/ML
4 INJECTION INTRAMUSCULAR; INTRAVENOUS EVERY 6 HOURS PRN
Status: DISCONTINUED | OUTPATIENT
Start: 2023-08-22 | End: 2023-08-25 | Stop reason: HOSPADM

## 2023-08-22 RX ORDER — ONDANSETRON 4 MG/1
4 TABLET, ORALLY DISINTEGRATING ORAL EVERY 8 HOURS PRN
Status: DISCONTINUED | OUTPATIENT
Start: 2023-08-22 | End: 2023-08-25 | Stop reason: HOSPADM

## 2023-08-22 RX ORDER — METHOCARBAMOL 500 MG/1
1000 TABLET, FILM COATED ORAL 4 TIMES DAILY
Status: DISCONTINUED | OUTPATIENT
Start: 2023-08-22 | End: 2023-08-25 | Stop reason: HOSPADM

## 2023-08-22 RX ORDER — LIDOCAINE 4 G/G
1 PATCH TOPICAL DAILY
Status: DISCONTINUED | OUTPATIENT
Start: 2023-08-23 | End: 2023-08-25 | Stop reason: HOSPADM

## 2023-08-22 RX ORDER — TRAMADOL HYDROCHLORIDE 50 MG/1
50 TABLET ORAL EVERY 6 HOURS PRN
Status: DISCONTINUED | OUTPATIENT
Start: 2023-08-22 | End: 2023-08-25 | Stop reason: HOSPADM

## 2023-08-22 RX ORDER — MORPHINE SULFATE 4 MG/ML
4 INJECTION, SOLUTION INTRAMUSCULAR; INTRAVENOUS
Status: COMPLETED | OUTPATIENT
Start: 2023-08-22 | End: 2023-08-22

## 2023-08-22 RX ORDER — ONDANSETRON 2 MG/ML
4 INJECTION INTRAMUSCULAR; INTRAVENOUS ONCE
Status: COMPLETED | OUTPATIENT
Start: 2023-08-22 | End: 2023-08-22

## 2023-08-22 RX ORDER — ENOXAPARIN SODIUM 100 MG/ML
40 INJECTION SUBCUTANEOUS DAILY
Status: DISCONTINUED | OUTPATIENT
Start: 2023-08-23 | End: 2023-08-25 | Stop reason: HOSPADM

## 2023-08-22 RX ORDER — HYDROMORPHONE HYDROCHLORIDE 1 MG/ML
1 INJECTION, SOLUTION INTRAMUSCULAR; INTRAVENOUS; SUBCUTANEOUS EVERY 4 HOURS PRN
Status: ACTIVE | OUTPATIENT
Start: 2023-08-22 | End: 2023-08-23

## 2023-08-22 RX ADMIN — MORPHINE SULFATE 4 MG: 4 INJECTION INTRAVENOUS at 17:22

## 2023-08-22 RX ADMIN — METHOCARBAMOL TABLETS 1000 MG: 500 TABLET, COATED ORAL at 22:44

## 2023-08-22 RX ADMIN — ONDANSETRON 4 MG: 2 INJECTION INTRAMUSCULAR; INTRAVENOUS at 18:31

## 2023-08-22 RX ADMIN — IOPAMIDOL 100 ML: 755 INJECTION, SOLUTION INTRAVENOUS at 17:14

## 2023-08-22 RX ADMIN — SODIUM CHLORIDE, PRESERVATIVE FREE 10 ML: 5 INJECTION INTRAVENOUS at 22:44

## 2023-08-22 RX ADMIN — MORPHINE SULFATE 4 MG: 4 INJECTION INTRAVENOUS at 18:18

## 2023-08-22 RX ADMIN — ACETAMINOPHEN 650 MG: 325 TABLET ORAL at 22:44

## 2023-08-22 RX ADMIN — ONDANSETRON 4 MG: 4 TABLET, ORALLY DISINTEGRATING ORAL at 23:25

## 2023-08-22 RX ADMIN — HYDROMORPHONE HYDROCHLORIDE 1 MG: 1 INJECTION, SOLUTION INTRAMUSCULAR; INTRAVENOUS; SUBCUTANEOUS at 21:18

## 2023-08-22 RX ADMIN — KETOROLAC TROMETHAMINE 15 MG: 15 INJECTION, SOLUTION INTRAMUSCULAR; INTRAVENOUS at 22:44

## 2023-08-22 ASSESSMENT — PAIN SCALES - GENERAL
PAINLEVEL_OUTOF10: 5
PAINLEVEL_OUTOF10: 5
PAINLEVEL_OUTOF10: 9
PAINLEVEL_OUTOF10: 10
PAINLEVEL_OUTOF10: 7
PAINLEVEL_OUTOF10: 5
PAINLEVEL_OUTOF10: 7
PAINLEVEL_OUTOF10: 5

## 2023-08-22 ASSESSMENT — PAIN DESCRIPTION - DESCRIPTORS: DESCRIPTORS: ACHING

## 2023-08-22 ASSESSMENT — LIFESTYLE VARIABLES
HOW OFTEN DO YOU HAVE A DRINK CONTAINING ALCOHOL: NEVER
HOW MANY STANDARD DRINKS CONTAINING ALCOHOL DO YOU HAVE ON A TYPICAL DAY: PATIENT DOES NOT DRINK

## 2023-08-22 ASSESSMENT — PAIN DESCRIPTION - LOCATION: LOCATION: RIB CAGE;BACK

## 2023-08-22 ASSESSMENT — PAIN - FUNCTIONAL ASSESSMENT: PAIN_FUNCTIONAL_ASSESSMENT: 0-10
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
## 2023-08-22 NOTE — ED NOTES
Pt placed on continuous cardiac monitoring, pulse ox, and blood pressure monitoring at this time.         Mercedes Hurley RN  08/22/23 9437

## 2023-08-22 NOTE — ED NOTES
Pt to CT at this time with Uriah SHAH and on Dr [facility] [employer] monitor.       Hector Murphy RN  08/22/23 2573
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
## 2023-08-22 NOTE — ED NOTES
Pt has returned to ED at this time, and monitoring has been resumed.           Jeannett Merlin, RN  08/22/23 3256
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
## 2023-08-23 ENCOUNTER — APPOINTMENT (OUTPATIENT)
Facility: HOSPITAL | Age: 63
End: 2023-08-23
Payer: MEDICAID

## 2023-08-23 PROBLEM — S22.41XD CLOSED FRACTURE OF MULTIPLE RIBS OF RIGHT SIDE WITH ROUTINE HEALING: Status: ACTIVE | Noted: 2023-08-22

## 2023-08-23 LAB
ALBUMIN SERPL-MCNC: 2.9 G/DL (ref 3.5–5)
ALBUMIN/GLOB SERPL: 0.9 (ref 1.1–2.2)
ALP SERPL-CCNC: 59 U/L (ref 45–117)
ALT SERPL-CCNC: 52 U/L (ref 12–78)
ANION GAP SERPL CALC-SCNC: 5 MMOL/L (ref 5–15)
AST SERPL W P-5'-P-CCNC: 92 U/L (ref 15–37)
BASOPHILS # BLD: 0 K/UL (ref 0–0.1)
BASOPHILS NFR BLD: 0 % (ref 0–1)
BILIRUB SERPL-MCNC: 0.4 MG/DL (ref 0.2–1)
BUN SERPL-MCNC: 12 MG/DL (ref 6–20)
BUN/CREAT SERPL: 15 (ref 12–20)
CA-I BLD-MCNC: 8.5 MG/DL (ref 8.5–10.1)
CHLORIDE SERPL-SCNC: 110 MMOL/L (ref 97–108)
CK SERPL-CCNC: 4118 U/L (ref 26–192)
CO2 SERPL-SCNC: 26 MMOL/L (ref 21–32)
CREAT SERPL-MCNC: 0.78 MG/DL (ref 0.55–1.02)
CRP SERPL-MCNC: 6.26 MG/DL (ref 0–0.6)
DIFFERENTIAL METHOD BLD: ABNORMAL
EOSINOPHIL # BLD: 0 K/UL (ref 0–0.4)
EOSINOPHIL NFR BLD: 0 % (ref 0–7)
ERYTHROCYTE [DISTWIDTH] IN BLOOD BY AUTOMATED COUNT: 14.2 % (ref 11.5–14.5)
GLOBULIN SER CALC-MCNC: 3.3 G/DL (ref 2–4)
GLUCOSE SERPL-MCNC: 126 MG/DL (ref 65–100)
HCT VFR BLD AUTO: 33.6 % (ref 35–47)
HGB BLD-MCNC: 10.6 G/DL (ref 11.5–16)
IMM GRANULOCYTES # BLD AUTO: 0 K/UL (ref 0–0.04)
IMM GRANULOCYTES NFR BLD AUTO: 0 % (ref 0–0.5)
LYMPHOCYTES # BLD: 1.2 K/UL (ref 0.8–3.5)
LYMPHOCYTES NFR BLD: 14 % (ref 12–49)
MCH RBC QN AUTO: 30.5 PG (ref 26–34)
MCHC RBC AUTO-ENTMCNC: 31.5 G/DL (ref 30–36.5)
MCV RBC AUTO: 96.6 FL (ref 80–99)
MONOCYTES # BLD: 0.4 K/UL (ref 0–1)
MONOCYTES NFR BLD: 5 % (ref 5–13)
NEUTS SEG # BLD: 6.6 K/UL (ref 1.8–8)
NEUTS SEG NFR BLD: 81 % (ref 32–75)
NRBC # BLD: 0 K/UL (ref 0–0.01)
NRBC BLD-RTO: 0 PER 100 WBC
PLATELET # BLD AUTO: 183 K/UL (ref 150–400)
PMV BLD AUTO: 9.6 FL (ref 8.9–12.9)
POTASSIUM SERPL-SCNC: 3.8 MMOL/L (ref 3.5–5.1)
PROT SERPL-MCNC: 6.2 G/DL (ref 6.4–8.2)
RBC # BLD AUTO: 3.48 M/UL (ref 3.8–5.2)
SODIUM SERPL-SCNC: 141 MMOL/L (ref 136–145)
T3FREE SERPL-MCNC: 2 PG/ML (ref 2.2–4)
T4 FREE SERPL-MCNC: 1 NG/DL (ref 0.8–1.5)
TSH SERPL DL<=0.05 MIU/L-ACNC: 0.24 UIU/ML (ref 0.36–3.74)
TSH SERPL DL<=0.05 MIU/L-ACNC: 0.29 UIU/ML (ref 0.36–3.74)
WBC # BLD AUTO: 8.2 K/UL (ref 3.6–11)

## 2023-08-23 PROCEDURE — 6370000000 HC RX 637 (ALT 250 FOR IP): Performed by: INTERNAL MEDICINE

## 2023-08-23 PROCEDURE — 84481 FREE ASSAY (FT-3): CPT

## 2023-08-23 PROCEDURE — 86140 C-REACTIVE PROTEIN: CPT

## 2023-08-23 PROCEDURE — 85025 COMPLETE CBC W/AUTO DIFF WBC: CPT

## 2023-08-23 PROCEDURE — 71045 X-RAY EXAM CHEST 1 VIEW: CPT

## 2023-08-23 PROCEDURE — 6370000000 HC RX 637 (ALT 250 FOR IP): Performed by: SURGERY

## 2023-08-23 PROCEDURE — 6360000002 HC RX W HCPCS: Performed by: INTERNAL MEDICINE

## 2023-08-23 PROCEDURE — 99233 SBSQ HOSP IP/OBS HIGH 50: CPT | Performed by: SURGERY

## 2023-08-23 PROCEDURE — 97530 THERAPEUTIC ACTIVITIES: CPT

## 2023-08-23 PROCEDURE — 6360000002 HC RX W HCPCS: Performed by: SURGERY

## 2023-08-23 PROCEDURE — 2580000003 HC RX 258: Performed by: SURGERY

## 2023-08-23 PROCEDURE — 86361 T CELL ABSOLUTE COUNT: CPT

## 2023-08-23 PROCEDURE — 36415 COLL VENOUS BLD VENIPUNCTURE: CPT

## 2023-08-23 PROCEDURE — 2580000003 HC RX 258: Performed by: INTERNAL MEDICINE

## 2023-08-23 PROCEDURE — 1100000000 HC RM PRIVATE

## 2023-08-23 PROCEDURE — 84443 ASSAY THYROID STIM HORMONE: CPT

## 2023-08-23 PROCEDURE — 84439 ASSAY OF FREE THYROXINE: CPT

## 2023-08-23 PROCEDURE — 6370000000 HC RX 637 (ALT 250 FOR IP): Performed by: STUDENT IN AN ORGANIZED HEALTH CARE EDUCATION/TRAINING PROGRAM

## 2023-08-23 PROCEDURE — 97165 OT EVAL LOW COMPLEX 30 MIN: CPT

## 2023-08-23 PROCEDURE — 97161 PT EVAL LOW COMPLEX 20 MIN: CPT

## 2023-08-23 PROCEDURE — 73630 X-RAY EXAM OF FOOT: CPT

## 2023-08-23 PROCEDURE — 80053 COMPREHEN METABOLIC PANEL: CPT

## 2023-08-23 PROCEDURE — 82550 ASSAY OF CK (CPK): CPT

## 2023-08-23 RX ORDER — GUAIFENESIN 600 MG/1
600 TABLET, EXTENDED RELEASE ORAL 2 TIMES DAILY
Status: DISCONTINUED | OUTPATIENT
Start: 2023-08-23 | End: 2023-08-25 | Stop reason: HOSPADM

## 2023-08-23 RX ORDER — FLUTICASONE PROPIONATE 50 MCG
1 SPRAY, SUSPENSION (ML) NASAL 2 TIMES DAILY
Status: DISCONTINUED | OUTPATIENT
Start: 2023-08-23 | End: 2023-08-25 | Stop reason: HOSPADM

## 2023-08-23 RX ORDER — IPRATROPIUM BROMIDE AND ALBUTEROL SULFATE 2.5; .5 MG/3ML; MG/3ML
1 SOLUTION RESPIRATORY (INHALATION) EVERY 4 HOURS PRN
Status: DISCONTINUED | OUTPATIENT
Start: 2023-08-23 | End: 2023-08-25 | Stop reason: HOSPADM

## 2023-08-23 RX ORDER — SODIUM CHLORIDE 9 MG/ML
INJECTION, SOLUTION INTRAVENOUS CONTINUOUS
Status: DISPENSED | OUTPATIENT
Start: 2023-08-23 | End: 2023-08-24

## 2023-08-23 RX ADMIN — METHOCARBAMOL TABLETS 1000 MG: 500 TABLET, COATED ORAL at 09:54

## 2023-08-23 RX ADMIN — POLYETHYLENE GLYCOL 3350 17 G: 17 POWDER, FOR SOLUTION ORAL at 09:54

## 2023-08-23 RX ADMIN — ABACAVIR SULFATE, DOLUTEGRAVIR SODIUM, LAMIVUDINE 600 MG: 600; 50; 300 TABLET, FILM COATED ORAL at 09:54

## 2023-08-23 RX ADMIN — METHOCARBAMOL TABLETS 1000 MG: 500 TABLET, COATED ORAL at 20:45

## 2023-08-23 RX ADMIN — FLUTICASONE PROPIONATE 1 SPRAY: 50 SPRAY, METERED NASAL at 20:45

## 2023-08-23 RX ADMIN — SODIUM CHLORIDE, PRESERVATIVE FREE 10 ML: 5 INJECTION INTRAVENOUS at 09:56

## 2023-08-23 RX ADMIN — GUAIFENESIN 600 MG: 600 TABLET, EXTENDED RELEASE ORAL at 12:57

## 2023-08-23 RX ADMIN — ENOXAPARIN SODIUM 40 MG: 100 INJECTION SUBCUTANEOUS at 09:44

## 2023-08-23 RX ADMIN — AMLODIPINE BESYLATE 5 MG: 5 TABLET ORAL at 09:54

## 2023-08-23 RX ADMIN — METHOCARBAMOL TABLETS 1000 MG: 500 TABLET, COATED ORAL at 16:33

## 2023-08-23 RX ADMIN — KETOROLAC TROMETHAMINE 15 MG: 15 INJECTION, SOLUTION INTRAMUSCULAR; INTRAVENOUS at 04:13

## 2023-08-23 RX ADMIN — ACETAMINOPHEN 650 MG: 325 TABLET ORAL at 09:54

## 2023-08-23 RX ADMIN — AZITHROMYCIN DIHYDRATE 500 MG: 500 INJECTION, POWDER, LYOPHILIZED, FOR SOLUTION INTRAVENOUS at 09:45

## 2023-08-23 RX ADMIN — METHOCARBAMOL TABLETS 1000 MG: 500 TABLET, COATED ORAL at 12:57

## 2023-08-23 RX ADMIN — SODIUM CHLORIDE: 9 INJECTION, SOLUTION INTRAVENOUS at 16:33

## 2023-08-23 RX ADMIN — KETOROLAC TROMETHAMINE 15 MG: 15 INJECTION, SOLUTION INTRAMUSCULAR; INTRAVENOUS at 09:57

## 2023-08-23 RX ADMIN — ACETAMINOPHEN 650 MG: 325 TABLET ORAL at 04:14

## 2023-08-23 RX ADMIN — ACETAMINOPHEN 650 MG: 325 TABLET ORAL at 20:45

## 2023-08-23 RX ADMIN — GUAIFENESIN 600 MG: 600 TABLET, EXTENDED RELEASE ORAL at 20:45

## 2023-08-23 RX ADMIN — CEFTRIAXONE SODIUM 1000 MG: 1 INJECTION, POWDER, FOR SOLUTION INTRAMUSCULAR; INTRAVENOUS at 09:47

## 2023-08-23 RX ADMIN — KETOROLAC TROMETHAMINE 15 MG: 15 INJECTION, SOLUTION INTRAMUSCULAR; INTRAVENOUS at 20:45

## 2023-08-23 RX ADMIN — KETOROLAC TROMETHAMINE 15 MG: 15 INJECTION, SOLUTION INTRAMUSCULAR; INTRAVENOUS at 15:11

## 2023-08-23 RX ADMIN — ACETAMINOPHEN 650 MG: 325 TABLET ORAL at 15:11

## 2023-08-23 RX ADMIN — ONDANSETRON 4 MG: 2 INJECTION INTRAMUSCULAR; INTRAVENOUS at 12:01

## 2023-08-23 ASSESSMENT — PAIN DESCRIPTION - DESCRIPTORS
DESCRIPTORS: ACHING;SQUEEZING;CRAMPING
DESCRIPTORS: ACHING

## 2023-08-23 ASSESSMENT — PAIN DESCRIPTION - LOCATION
LOCATION: BACK;FOOT
LOCATION: CHEST
LOCATION: BACK

## 2023-08-23 ASSESSMENT — PAIN SCALES - GENERAL
PAINLEVEL_OUTOF10: 2
PAINLEVEL_OUTOF10: 10
PAINLEVEL_OUTOF10: 0
PAINLEVEL_OUTOF10: 6

## 2023-08-23 ASSESSMENT — PAIN DESCRIPTION - ONSET: ONSET: AWAKENED FROM SLEEP

## 2023-08-23 ASSESSMENT — PAIN DESCRIPTION - ORIENTATION: ORIENTATION: LEFT;DISTAL

## 2023-08-23 NOTE — CARE COORDINATION
08/23/23 1144   Service Assessment   Patient Orientation Alert and Oriented   Cognition Alert   History Provided By Patient   Primary Caregiver Self   Accompanied By/Relationship alone   Support Systems Spouse/Significant Other;Children   Patient's Healthcare Decision Maker is: Legal Next of Kin   PCP Verified by CM Yes   Last Visit to PCP Within last 3 months   Prior Functional Level Independent in ADLs/IADLs   Current Functional Level Independent in ADLs/IADLs   Can patient return to prior living arrangement Yes   Ability to make needs known: Good   Family able to assist with home care needs: Yes   Would you like for me to discuss the discharge plan with any other family members/significant others, and if so, who? Yes  Deepak Grief (sister))   Financial Resources Medicaid   Community Resources None   Social/Functional History   Lives With Significant other   Type of [address] One level   Home Access Stairs to enter with rails   Entrance Stairs - Number of Steps 4   Entrance Stairs - Rails Right   Receives Help From Other (comment)  (Independent at baseline)   ADL Assistance Independent   Homemaking Assistance Independent   Ambulation Assistance Independent   Transfer Assistance Independent   Active  Yes   Occupation Unemployed; On disability   Discharge Planning   Type of Residence House   Living Arrangements Spouse/Significant Other   Current Services Prior To Admission None   Potential Assistance Needed N/A   DME Ordered? No   Potential Assistance Purchasing Medications No   Patient expects to be discharged to: House   One/Two Story Residence One story   History of falls? 0   Services At/After Discharge   Transition of Care Consult (CM Consult) N/A   Services At/After Discharge None   [employer] Information Provided? No   Confirm Follow Up Transport Family  (Pt says a friend/family would transport)     CM met with pt at bedside to verify demographics and discuss dispo.  Pt is from
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
## 2023-08-23 NOTE — PROGRESS NOTES
Spiritual Care Assessment/Progress Note  [address]    Name: Hailey Barillas MRN: [id number]    Age: 70 y.o. Sex: female   Language: English     Date: 8/23/2023            Total Time Calculated: 100 min              Spiritual Assessment begun in [address] SURGICAL  Service Provided For[de-identified] Patient  Referral/Consult From[de-identified] Nurse  Encounter Overview/Reason : Spiritual/Emotional Needs    Spiritual beliefs:      [x] Involved in a magalie tradition/spiritual practice:  Jehovah Witness / Shelly Castillo      [] Supported by a magalie community:      [] Claims no spiritual orientation:      [] Seeking spiritual identity:           [] Adheres to an individual form of spirituality:      [] Not able to assess:                Identified resources for coping and support system:   Support System: Significant other, Children       [x] Prayer                  [] Devotional reading               [] Music                  [] Guided Imagery     [] Pet visits                                        [] Other: (COMMENT)     Specific area/focus of visit   Encounter:    Crisis: Type: Trauma  Spiritual/Emotional needs: Type: Spiritual Support, Emotional Distress, Spiritual Distress, Difficult news received  Ritual, Rites and Sacraments:    Grief, Loss, and Adjustments:    Ethics/Mediation:    Behavioral Health:    Palliative Care: Advance Care Planning:      Plan/Referrals: Continue Support (comment)    Narrative:   Spiritual Consult for emotional distress:  Spiritual care assessment with Ms. Latoya Ott on [address] [place] [address]. Pt is sitting in chair, awake, alert and welcoming. She immediately engage in much life review. She shared her life story, often in tears. She is grieving some of the mistakes she has made in life, and desiring for the restoration and peace with her 3 grown children. Her children are available for care and support, but critical of her, which cause hurt within Ms. Gonzales.  She expressed being

## 2023-08-23 NOTE — PLAN OF CARE
Care plan reviewed. Problem: Pain  Goal: Verbalizes/displays adequate comfort level or baseline comfort level  8/23/2023 1109 by Olga Valencia RN  Outcome: Progressing     Problem: Skin/Tissue Integrity  Goal: Absence of new skin breakdown  Description: 1. Monitor for areas of redness and/or skin breakdown  2. Assess vascular access sites hourly  3. Every 4-6 hours minimum:  Change oxygen saturation probe site  4. Every 4-6 hours:  If on nasal continuous positive airway pressure, respiratory therapy assess nares and determine need for appliance change or resting period.   8/23/2023 1109 by Olga Valencia RN  Outcome: Progressing     Problem: Safety - Adult  Goal: Free from fall injury  8/23/2023 1109 by Olga Valencia RN  Outcome: Progressing
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
## 2023-08-24 LAB
ALBUMIN SERPL-MCNC: 2.7 G/DL (ref 3.5–5)
ALBUMIN/GLOB SERPL: 0.8 (ref 1.1–2.2)
ALP SERPL-CCNC: 56 U/L (ref 45–117)
ALT SERPL-CCNC: 48 U/L (ref 12–78)
ANION GAP SERPL CALC-SCNC: 2 MMOL/L (ref 5–15)
AST SERPL W P-5'-P-CCNC: 70 U/L (ref 15–37)
BILIRUB SERPL-MCNC: 0.3 MG/DL (ref 0.2–1)
BUN SERPL-MCNC: 11 MG/DL (ref 6–20)
BUN/CREAT SERPL: 15 (ref 12–20)
CA-I BLD-MCNC: 8.2 MG/DL (ref 8.5–10.1)
CHLORIDE SERPL-SCNC: 112 MMOL/L (ref 97–108)
CK SERPL-CCNC: 2666 U/L (ref 26–192)
CO2 SERPL-SCNC: 28 MMOL/L (ref 21–32)
CREAT SERPL-MCNC: 0.73 MG/DL (ref 0.55–1.02)
ERYTHROCYTE [DISTWIDTH] IN BLOOD BY AUTOMATED COUNT: 14.2 % (ref 11.5–14.5)
GLOBULIN SER CALC-MCNC: 3.2 G/DL (ref 2–4)
GLUCOSE SERPL-MCNC: 112 MG/DL (ref 65–100)
HCT VFR BLD AUTO: 31.6 % (ref 35–47)
HGB BLD-MCNC: 9.8 G/DL (ref 11.5–16)
MCH RBC QN AUTO: 30.1 PG (ref 26–34)
MCHC RBC AUTO-ENTMCNC: 31 G/DL (ref 30–36.5)
MCV RBC AUTO: 96.9 FL (ref 80–99)
NRBC # BLD: 0 K/UL (ref 0–0.01)
NRBC BLD-RTO: 0 PER 100 WBC
PLATELET # BLD AUTO: 177 K/UL (ref 150–400)
PMV BLD AUTO: 10.1 FL (ref 8.9–12.9)
POTASSIUM SERPL-SCNC: 3.6 MMOL/L (ref 3.5–5.1)
PROT SERPL-MCNC: 5.9 G/DL (ref 6.4–8.2)
RBC # BLD AUTO: 3.26 M/UL (ref 3.8–5.2)
SODIUM SERPL-SCNC: 142 MMOL/L (ref 136–145)
WBC # BLD AUTO: 7.2 K/UL (ref 3.6–11)

## 2023-08-24 PROCEDURE — 1100000000 HC RM PRIVATE

## 2023-08-24 PROCEDURE — 6370000000 HC RX 637 (ALT 250 FOR IP): Performed by: SURGERY

## 2023-08-24 PROCEDURE — 85027 COMPLETE CBC AUTOMATED: CPT

## 2023-08-24 PROCEDURE — 99232 SBSQ HOSP IP/OBS MODERATE 35: CPT | Performed by: SURGERY

## 2023-08-24 PROCEDURE — 2580000003 HC RX 258: Performed by: INTERNAL MEDICINE

## 2023-08-24 PROCEDURE — 80053 COMPREHEN METABOLIC PANEL: CPT

## 2023-08-24 PROCEDURE — 6370000000 HC RX 637 (ALT 250 FOR IP): Performed by: INTERNAL MEDICINE

## 2023-08-24 PROCEDURE — 82550 ASSAY OF CK (CPK): CPT

## 2023-08-24 PROCEDURE — 6360000002 HC RX W HCPCS: Performed by: STUDENT IN AN ORGANIZED HEALTH CARE EDUCATION/TRAINING PROGRAM

## 2023-08-24 PROCEDURE — 36415 COLL VENOUS BLD VENIPUNCTURE: CPT

## 2023-08-24 PROCEDURE — 6360000002 HC RX W HCPCS: Performed by: INTERNAL MEDICINE

## 2023-08-24 PROCEDURE — 6360000002 HC RX W HCPCS: Performed by: SURGERY

## 2023-08-24 PROCEDURE — 6370000000 HC RX 637 (ALT 250 FOR IP): Performed by: STUDENT IN AN ORGANIZED HEALTH CARE EDUCATION/TRAINING PROGRAM

## 2023-08-24 PROCEDURE — 2580000003 HC RX 258: Performed by: SURGERY

## 2023-08-24 RX ORDER — PROCHLORPERAZINE EDISYLATE 5 MG/ML
5 INJECTION INTRAMUSCULAR; INTRAVENOUS EVERY 6 HOURS PRN
Status: DISCONTINUED | OUTPATIENT
Start: 2023-08-24 | End: 2023-08-25 | Stop reason: HOSPADM

## 2023-08-24 RX ORDER — AZITHROMYCIN 500 MG/1
500 TABLET, FILM COATED ORAL DAILY
Qty: 1 PACKET | Refills: 0 | Status: SHIPPED | OUTPATIENT
Start: 2023-08-25 | End: 2023-08-28

## 2023-08-24 RX ORDER — GUAIFENESIN 600 MG/1
600 TABLET, EXTENDED RELEASE ORAL 2 TIMES DAILY
Qty: 14 TABLET | Refills: 0 | Status: SHIPPED | OUTPATIENT
Start: 2023-08-24 | End: 2023-08-31

## 2023-08-24 RX ADMIN — METHOCARBAMOL TABLETS 1000 MG: 500 TABLET, COATED ORAL at 09:05

## 2023-08-24 RX ADMIN — ACETAMINOPHEN 650 MG: 325 TABLET ORAL at 22:46

## 2023-08-24 RX ADMIN — KETOROLAC TROMETHAMINE 15 MG: 15 INJECTION, SOLUTION INTRAMUSCULAR; INTRAVENOUS at 09:06

## 2023-08-24 RX ADMIN — TRAMADOL HYDROCHLORIDE 50 MG: 50 TABLET ORAL at 01:35

## 2023-08-24 RX ADMIN — KETOROLAC TROMETHAMINE 15 MG: 15 INJECTION, SOLUTION INTRAMUSCULAR; INTRAVENOUS at 03:19

## 2023-08-24 RX ADMIN — KETOROLAC TROMETHAMINE 15 MG: 15 INJECTION, SOLUTION INTRAMUSCULAR; INTRAVENOUS at 22:46

## 2023-08-24 RX ADMIN — POLYETHYLENE GLYCOL 3350 17 G: 17 POWDER, FOR SOLUTION ORAL at 09:06

## 2023-08-24 RX ADMIN — ONDANSETRON 4 MG: 4 TABLET, ORALLY DISINTEGRATING ORAL at 02:41

## 2023-08-24 RX ADMIN — GUAIFENESIN 600 MG: 600 TABLET, EXTENDED RELEASE ORAL at 22:46

## 2023-08-24 RX ADMIN — GUAIFENESIN 600 MG: 600 TABLET, EXTENDED RELEASE ORAL at 09:05

## 2023-08-24 RX ADMIN — METHOCARBAMOL TABLETS 1000 MG: 500 TABLET, COATED ORAL at 17:40

## 2023-08-24 RX ADMIN — METHOCARBAMOL TABLETS 1000 MG: 500 TABLET, COATED ORAL at 22:46

## 2023-08-24 RX ADMIN — ENOXAPARIN SODIUM 40 MG: 100 INJECTION SUBCUTANEOUS at 09:12

## 2023-08-24 RX ADMIN — METHOCARBAMOL TABLETS 1000 MG: 500 TABLET, COATED ORAL at 12:35

## 2023-08-24 RX ADMIN — AZITHROMYCIN DIHYDRATE 500 MG: 500 INJECTION, POWDER, LYOPHILIZED, FOR SOLUTION INTRAVENOUS at 09:11

## 2023-08-24 RX ADMIN — CEFTRIAXONE SODIUM 1000 MG: 1 INJECTION, POWDER, FOR SOLUTION INTRAMUSCULAR; INTRAVENOUS at 09:06

## 2023-08-24 RX ADMIN — ONDANSETRON 4 MG: 2 INJECTION INTRAMUSCULAR; INTRAVENOUS at 09:02

## 2023-08-24 RX ADMIN — SODIUM CHLORIDE: 9 INJECTION, SOLUTION INTRAVENOUS at 13:24

## 2023-08-24 RX ADMIN — PROCHLORPERAZINE EDISYLATE 5 MG: 5 INJECTION, SOLUTION INTRAMUSCULAR; INTRAVENOUS at 14:47

## 2023-08-24 RX ADMIN — AMLODIPINE BESYLATE 5 MG: 5 TABLET ORAL at 09:05

## 2023-08-24 RX ADMIN — SODIUM CHLORIDE: 9 INJECTION, SOLUTION INTRAVENOUS at 03:19

## 2023-08-24 RX ADMIN — FLUTICASONE PROPIONATE 1 SPRAY: 50 SPRAY, METERED NASAL at 09:12

## 2023-08-24 RX ADMIN — SODIUM CHLORIDE, PRESERVATIVE FREE 10 ML: 5 INJECTION INTRAVENOUS at 09:07

## 2023-08-24 RX ADMIN — ABACAVIR SULFATE, DOLUTEGRAVIR SODIUM, LAMIVUDINE 600 MG: 600; 50; 300 TABLET, FILM COATED ORAL at 09:11

## 2023-08-24 RX ADMIN — ACETAMINOPHEN 650 MG: 325 TABLET ORAL at 09:14

## 2023-08-24 ASSESSMENT — PAIN SCALES - GENERAL
PAINLEVEL_OUTOF10: 0
PAINLEVEL_OUTOF10: 3
PAINLEVEL_OUTOF10: 6
PAINLEVEL_OUTOF10: 7
PAINLEVEL_OUTOF10: 8
PAINLEVEL_OUTOF10: 6

## 2023-08-24 ASSESSMENT — PAIN DESCRIPTION - LOCATION
LOCATION: BACK
LOCATION: NECK

## 2023-08-24 NOTE — PROGRESS NOTES
Writer reached out to PA to clarify expiration of telemetry monitoring thru [facility], provider has no response as of yet. Will continue to do cardiac monitoring until provider responds.

## 2023-08-24 NOTE — PROGRESS NOTES
Attempted PT 1018 however PCT's reports pt on commode attempting to have BM and will require increased time. Will continue to follow and attempt at a later time. Thank you.
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
## 2023-08-24 NOTE — CARE COORDINATION
7178: CM has reviewed pt chart    PT/OT evaluated and recs HH with family assist. CM will meet with pt to get choice. Pt will need rw as well. DCP: HH  and rw    3455:  [facility] is [facility] [address] [facility] and they can only offer PT

## 2023-08-24 NOTE — PROGRESS NOTES
Compazine 5 mg IVP q 6 h PRN interchanged for Phenergan 12.5 mg IVPB q 6 h PRN per [facility] therapeutic interchange policy

## 2023-08-24 NOTE — PROGRESS NOTES
Attempted PT 3049 however pt just received medication and peacefully resting. Nursing advising to hold at this time. Will continue to follow and attempt at a later time. Thank you.

## 2023-08-24 NOTE — PLAN OF CARE
Problem: Discharge Planning  Goal: Discharge to home or other facility with appropriate resources  Outcome: Progressing     Problem: Pain  Goal: Verbalizes/displays adequate comfort level or baseline comfort level  8/23/2023 2359 by Carline Christianson RN  Outcome: Progressing  8/23/2023 1109 by Shabana Valverde RN  Outcome: Progressing     Problem: Skin/Tissue Integrity  Goal: Absence of new skin breakdown  Description: 1. Monitor for areas of redness and/or skin breakdown  2. Assess vascular access sites hourly  3. Every 4-6 hours minimum:  Change oxygen saturation probe site  4. Every 4-6 hours:  If on nasal continuous positive airway pressure, respiratory therapy assess nares and determine need for appliance change or resting period. 8/23/2023 2359 by Carline Christianson RN  Outcome: Progressing  8/23/2023 1109 by Shabana Valverde RN  Outcome: Progressing     Problem: Safety - Adult  Goal: Free from fall injury  8/23/2023 2359 by Carline Christianson RN  Outcome: Progressing  8/23/2023 1109 by Shabana Valverde RN  Outcome: Progressing     Problem: ABCDS Injury Assessment  Goal: Absence of physical injury  Outcome: Progressing     Problem: Occupational Therapy - Adult  Goal: By Discharge: Performs self-care activities at highest level of function for planned discharge setting. See evaluation for individualized goals. Description: FUNCTIONAL STATUS PRIOR TO ADMISSION: Pt reports she was IND w/ functional mobility and ADLs. No falls    HOME SUPPORT: Pt lives with fiance. Occupational Therapy Goals:  Initiated 8/23/2023  Patient/Family stated goal: I want to get better  Patient will perform grooming in standing with mod I within 7 day(s). Patient will perform UB bathing with [place] within 7 day(s). Patient will perform LB bathing with [place] within 7 day(s). Patient will perform toilet transfers with Modified [place]  within 7 day(s).   Patient will perform all aspects of toileting with [place] within 7 day(s). Patient will participate in upper extremity therapeutic exercise/activities with [place] within 7 day(s). Patient will be mod I in 2-3 step meal preparation tasks in prep for household management tasks within 7 day(s).   8/23/2023 1120 by Socorro Dunne OT  Outcome: Progressing     Problem: Physical Therapy - Adult  Goal: By Discharge: Performs mobility at highest level of function for planned discharge setting. See evaluation for individualized goals. Description: FUNCTIONAL STATUS PRIOR TO ADMISSION: Patient was independent and active without use of DME. and The patient  was independent for basic and instrumental ADLs. HOME SUPPORT PRIOR TO ADMISSION: The patient lived with fiance but did not require assistance. Physical Therapy Goals  Initiated 8/23/2023  Pt stated goal: to go home  Pt will be I with LE HEP in 7 days. Pt will perform bed mobility with Modified [place] in 7 days. Pt will perform transfers with Modified [place] in 7 days. Pt will amb  feet with LRAD safely with Modified [place] in 7 days. Pt will ascend/descend 4 steps with right handrail(s) and Modified [place] in 7 days to safely enter/navigate home.      8/23/2023 1410 by Alisha Cavazos PT  Outcome: Progressing

## 2023-08-25 ENCOUNTER — APPOINTMENT (OUTPATIENT)
Facility: HOSPITAL | Age: 63
End: 2023-08-25
Payer: MEDICAID

## 2023-08-25 VITALS
TEMPERATURE: 98.2 F | RESPIRATION RATE: 17 BRPM | HEIGHT: 60 IN | DIASTOLIC BLOOD PRESSURE: 84 MMHG | WEIGHT: 141 LBS | SYSTOLIC BLOOD PRESSURE: 139 MMHG | OXYGEN SATURATION: 96 % | BODY MASS INDEX: 27.68 KG/M2 | HEART RATE: 100 BPM

## 2023-08-25 LAB
ANION GAP SERPL CALC-SCNC: 5 MMOL/L (ref 5–15)
BASOPHILS # BLD AUTO: 0 X10E3/UL (ref 0–0.2)
BASOPHILS NFR BLD AUTO: 0 %
BUN SERPL-MCNC: 7 MG/DL (ref 6–20)
BUN/CREAT SERPL: 11 (ref 12–20)
CA-I BLD-MCNC: 8.2 MG/DL (ref 8.5–10.1)
CD3+CD4+ CELLS # BLD: 659 /UL (ref 359–1519)
CD3+CD4+ CELLS NFR BLD: 54.9 % (ref 30.8–58.5)
CHLORIDE SERPL-SCNC: 113 MMOL/L (ref 97–108)
CK SERPL-CCNC: 1386 U/L (ref 26–192)
CO2 SERPL-SCNC: 26 MMOL/L (ref 21–32)
CREAT SERPL-MCNC: 0.64 MG/DL (ref 0.55–1.02)
EOSINOPHIL # BLD AUTO: 0 X10E3/UL (ref 0–0.4)
EOSINOPHIL NFR BLD AUTO: 0 %
ERYTHROCYTE [DISTWIDTH] IN BLOOD BY AUTOMATED COUNT: 13.6 % (ref 11.7–15.4)
ERYTHROCYTE [DISTWIDTH] IN BLOOD BY AUTOMATED COUNT: 14.3 % (ref 11.5–14.5)
GLUCOSE SERPL-MCNC: 95 MG/DL (ref 65–100)
HCT VFR BLD AUTO: 28.3 % (ref 35–47)
HCT VFR BLD AUTO: 32.1 % (ref 34–46.6)
HGB BLD-MCNC: 10.8 G/DL (ref 11.1–15.9)
HGB BLD-MCNC: 9.1 G/DL (ref 11.5–16)
IMM GRANULOCYTES # BLD: 0 X10E3/UL (ref 0–0.1)
IMM GRANULOCYTES NFR BLD: 0 %
IMMATURE CELLS: ABNORMAL
LYMPHOCYTES # BLD AUTO: 1.2 X10E3/UL (ref 0.7–3.1)
LYMPHOCYTES NFR BLD AUTO: 15 %
MCH RBC QN AUTO: 30.9 PG (ref 26.6–33)
MCH RBC QN AUTO: 31 PG (ref 26–34)
MCHC RBC AUTO-ENTMCNC: 32.2 G/DL (ref 30–36.5)
MCHC RBC AUTO-ENTMCNC: 33.6 G/DL (ref 31.5–35.7)
MCV RBC AUTO: 92 FL (ref 79–97)
MCV RBC AUTO: 96.3 FL (ref 80–99)
MONOCYTES # BLD AUTO: 0.3 X10E3/UL (ref 0.1–0.9)
MONOCYTES NFR BLD AUTO: 4 %
MORPHOLOGY BLD-IMP: ABNORMAL
NEUTROPHILS # BLD AUTO: 6.6 X10E3/UL (ref 1.4–7)
NEUTROPHILS NFR BLD AUTO: 81 %
NRBC # BLD: 0.02 K/UL (ref 0–0.01)
NRBC BLD AUTO-RTO: ABNORMAL %
NRBC BLD-RTO: 0.3 PER 100 WBC
PLATELET # BLD AUTO: 168 K/UL (ref 150–400)
PLATELET # BLD AUTO: 197 X10E3/UL (ref 150–450)
PMV BLD AUTO: 10 FL (ref 8.9–12.9)
POTASSIUM SERPL-SCNC: 3.4 MMOL/L (ref 3.5–5.1)
RBC # BLD AUTO: 2.94 M/UL (ref 3.8–5.2)
RBC # BLD AUTO: 3.5 X10E6/UL (ref 3.77–5.28)
SODIUM SERPL-SCNC: 144 MMOL/L (ref 136–145)
WBC # BLD AUTO: 6.4 K/UL (ref 3.6–11)
WBC # BLD AUTO: 8.1 X10E3/UL (ref 3.4–10.8)

## 2023-08-25 PROCEDURE — 97116 GAIT TRAINING THERAPY: CPT

## 2023-08-25 PROCEDURE — 2580000003 HC RX 258: Performed by: INTERNAL MEDICINE

## 2023-08-25 PROCEDURE — 85027 COMPLETE CBC AUTOMATED: CPT

## 2023-08-25 PROCEDURE — 6360000002 HC RX W HCPCS: Performed by: SURGERY

## 2023-08-25 PROCEDURE — 99239 HOSP IP/OBS DSCHRG MGMT >30: CPT | Performed by: SURGERY

## 2023-08-25 PROCEDURE — 6370000000 HC RX 637 (ALT 250 FOR IP): Performed by: INTERNAL MEDICINE

## 2023-08-25 PROCEDURE — 6370000000 HC RX 637 (ALT 250 FOR IP): Performed by: SURGERY

## 2023-08-25 PROCEDURE — 6360000002 HC RX W HCPCS: Performed by: INTERNAL MEDICINE

## 2023-08-25 PROCEDURE — 71045 X-RAY EXAM CHEST 1 VIEW: CPT

## 2023-08-25 PROCEDURE — 80048 BASIC METABOLIC PNL TOTAL CA: CPT

## 2023-08-25 PROCEDURE — 2580000003 HC RX 258: Performed by: SURGERY

## 2023-08-25 PROCEDURE — 36415 COLL VENOUS BLD VENIPUNCTURE: CPT

## 2023-08-25 PROCEDURE — 97110 THERAPEUTIC EXERCISES: CPT

## 2023-08-25 PROCEDURE — 6370000000 HC RX 637 (ALT 250 FOR IP): Performed by: STUDENT IN AN ORGANIZED HEALTH CARE EDUCATION/TRAINING PROGRAM

## 2023-08-25 PROCEDURE — 82550 ASSAY OF CK (CPK): CPT

## 2023-08-25 RX ORDER — POTASSIUM CHLORIDE 20 MEQ/1
20 TABLET, EXTENDED RELEASE ORAL ONCE
Status: COMPLETED | OUTPATIENT
Start: 2023-08-25 | End: 2023-08-25

## 2023-08-25 RX ORDER — IBUPROFEN 800 MG/1
800 TABLET ORAL 3 TIMES DAILY PRN
Qty: 21 TABLET | Refills: 0 | Status: SHIPPED | OUTPATIENT
Start: 2023-08-25

## 2023-08-25 RX ORDER — TRAMADOL HYDROCHLORIDE 50 MG/1
50 TABLET ORAL EVERY 6 HOURS PRN
Qty: 12 TABLET | Refills: 0 | Status: SHIPPED | OUTPATIENT
Start: 2023-08-25 | End: 2023-08-28

## 2023-08-25 RX ORDER — METHOCARBAMOL 1000 MG/1
1000 TABLET, COATED ORAL 4 TIMES DAILY
Qty: 40 TABLET | Refills: 0 | Status: SHIPPED | OUTPATIENT
Start: 2023-08-25 | End: 2023-09-04

## 2023-08-25 RX ADMIN — METHOCARBAMOL TABLETS 1000 MG: 500 TABLET, COATED ORAL at 13:50

## 2023-08-25 RX ADMIN — GUAIFENESIN 600 MG: 600 TABLET, EXTENDED RELEASE ORAL at 08:39

## 2023-08-25 RX ADMIN — ENOXAPARIN SODIUM 40 MG: 100 INJECTION SUBCUTANEOUS at 08:40

## 2023-08-25 RX ADMIN — AZITHROMYCIN DIHYDRATE 500 MG: 500 INJECTION, POWDER, LYOPHILIZED, FOR SOLUTION INTRAVENOUS at 08:03

## 2023-08-25 RX ADMIN — METHOCARBAMOL TABLETS 1000 MG: 500 TABLET, COATED ORAL at 17:30

## 2023-08-25 RX ADMIN — ACETAMINOPHEN 650 MG: 325 TABLET ORAL at 17:30

## 2023-08-25 RX ADMIN — ACETAMINOPHEN 650 MG: 325 TABLET ORAL at 08:40

## 2023-08-25 RX ADMIN — POTASSIUM CHLORIDE 20 MEQ: 1500 TABLET, EXTENDED RELEASE ORAL at 08:40

## 2023-08-25 RX ADMIN — KETOROLAC TROMETHAMINE 15 MG: 15 INJECTION, SOLUTION INTRAMUSCULAR; INTRAVENOUS at 03:49

## 2023-08-25 RX ADMIN — AMLODIPINE BESYLATE 5 MG: 5 TABLET ORAL at 08:40

## 2023-08-25 RX ADMIN — CEFTRIAXONE SODIUM 1000 MG: 1 INJECTION, POWDER, FOR SOLUTION INTRAMUSCULAR; INTRAVENOUS at 08:02

## 2023-08-25 RX ADMIN — SODIUM CHLORIDE, PRESERVATIVE FREE 10 ML: 5 INJECTION INTRAVENOUS at 08:13

## 2023-08-25 RX ADMIN — KETOROLAC TROMETHAMINE 15 MG: 15 INJECTION, SOLUTION INTRAMUSCULAR; INTRAVENOUS at 08:40

## 2023-08-25 RX ADMIN — METHOCARBAMOL TABLETS 1000 MG: 500 TABLET, COATED ORAL at 08:39

## 2023-08-25 RX ADMIN — POLYETHYLENE GLYCOL 3350 17 G: 17 POWDER, FOR SOLUTION ORAL at 08:41

## 2023-08-25 RX ADMIN — ABACAVIR SULFATE, DOLUTEGRAVIR SODIUM, LAMIVUDINE 600 MG: 600; 50; 300 TABLET, FILM COATED ORAL at 08:41

## 2023-08-25 RX ADMIN — ACETAMINOPHEN 650 MG: 325 TABLET ORAL at 03:49

## 2023-08-25 ASSESSMENT — PAIN SCALES - GENERAL
PAINLEVEL_OUTOF10: 0
PAINLEVEL_OUTOF10: 0
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
## 2023-08-25 NOTE — CARE COORDINATION
Pt to dc home with [facility], PT Only (OT not offered). Transition of Care Plan:    RUR: 13%  Prior Level of Functioning: home self  Disposition: home [address]  If SNF or IPR: Date FOC offered: n/a  Date FOC received:   Accepting facility:   Date authorization started with reference number:   Date authorization received and expires: Follow up appointments: unit secretary will set up as needed  DME needed: rolling walker delivered 8/25  Transportation at discharge: Medicaid Transport Trip #[id number]  IM/IMM Medicare/ letter given: n/a  Is patient a [place] and connected with [facility]? no   If yes, was [employer] transfer form completed and VA notified? Caregiver Contact: n/a  Discharge Caregiver contacted prior to discharge?  Pt to notify  Care Conference needed? no  Barriers to discharge: none

## 2023-08-25 NOTE — PLAN OF CARE
64130/1     Marjan Rodriguez MRN: [id number]  AGE: 87 Problem: Physical Therapy - Adult  Goal: By Discharge: Performs mobility at highest level of function for planned discharge setting. See evaluation for individualized goals. Description: FUNCTIONAL STATUS PRIOR TO ADMISSION: Patient was independent and active without use of DME. and The patient  was independent for basic and instrumental ADLs. HOME SUPPORT PRIOR TO ADMISSION: The patient lived with fiance but did not require assistance. Physical Therapy Goals  Initiated 8/23/2023  Pt stated goal: to go home  Pt will be I with LE HEP in 7 days. Pt will perform bed mobility with Modified [place] in 7 days. Pt will perform transfers with Modified [place] in 7 days. Pt will amb  feet with LRAD safely with Modified [place] in 7 days. Pt will ascend/descend 4 steps with right handrail(s) and Modified [place] in 7 days to safely enter/navigate home. Outcome: Progressing            PHYSICAL THERAPY TREATMENT     Patient: Phuong German (62 y.o. female)  Date: 8/25/2023  Diagnosis: Closed fracture of multiple ribs of right side, initial encounter [S22.41XA]  Multiple closed fractures involving both upper extremities with rib and sternum, initial encounter [S42.301A, S42.302A, S22.20XA, S22.49XA] Closed fracture of multiple ribs of right side with routine healing      Precautions:  Fall Risk                In place during session: Peripheral IV and EKG/telemetry   Chart, physical therapy assessment, plan of care and goals were reviewed. ASSESSMENT  Patient continues with skilled PT services and is progressing towards goals. Pt sitting up in chair upon PT arrival, agreeable to session. Pt A&O x 4. (See below for objective details and assist levels). Overall pt tolerated session fair today with transfers and gait. Pt overall SBA with all mobility today and was motivated to participate in session.  Pt ambulated in hallway; observed decreased step length on the R

## 2023-08-25 NOTE — PLAN OF CARE
Problem: Physical Therapy - Adult  Goal: By Discharge: Performs mobility at highest level of function for planned discharge setting. See evaluation for individualized goals. Description: FUNCTIONAL STATUS PRIOR TO ADMISSION: Patient was independent and active without use of DME. and The patient  was independent for basic and instrumental ADLs. HOME SUPPORT PRIOR TO ADMISSION: The patient lived with fiance but did not require assistance. Physical Therapy Goals  Initiated 8/23/2023  Pt stated goal: to go home  Pt will be I with LE HEP in 7 days. Pt will perform bed mobility with Modified [place] in 7 days. Pt will perform transfers with Modified [place] in 7 days. Pt will amb  feet with LRAD safely with Modified [place] in 7 days. Pt will ascend/descend 4 steps with right handrail(s) and Modified [place] in 7 days to safely enter/navigate home. 8/25/2023 1052 by MAY Chávez  Outcome: Progressing  Monitor vs, promote rest, safe and comfort, adequate for discharge.
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
## 2023-08-25 NOTE — CARE COORDINATION
7271: JOSE has reviewed pt chart    Pt on IV abx. Chest xray pending. GYN consulted no note in. DCP: [facility] only accepting HH but can ONLY do PT    1057: CM met with pt post IDR and discussed dispo. Pt reports she will need transportation arranged. Pt also awaiting delivery of RW.     1302: JOSE called [employer] to get ETA on RW delivery. [employer] rep states will deliver within 2 hours.      1533: JOSE set up medicaid transport to home, Trip #[id number]

## 2023-09-14 ENCOUNTER — OFFICE VISIT (OUTPATIENT)
Age: 63
End: 2023-09-14

## 2023-09-14 VITALS
HEIGHT: 60 IN | BODY MASS INDEX: 27.21 KG/M2 | TEMPERATURE: 98 F | OXYGEN SATURATION: 97 % | RESPIRATION RATE: 17 BRPM | DIASTOLIC BLOOD PRESSURE: 88 MMHG | HEART RATE: 82 BPM | WEIGHT: 138.6 LBS | SYSTOLIC BLOOD PRESSURE: 153 MMHG

## 2023-09-14 DIAGNOSIS — N64.89 BREAST HEMATOMA, RESOLVING: ICD-10-CM

## 2023-09-14 DIAGNOSIS — S30.1XXD: ICD-10-CM

## 2023-09-14 DIAGNOSIS — S22.41XD CLOSED FRACTURE OF MULTIPLE RIBS OF RIGHT SIDE WITH ROUTINE HEALING, SUBSEQUENT ENCOUNTER: Primary | ICD-10-CM

## 2023-09-14 RX ORDER — LIDOCAINE 4 G/G
1 PATCH TOPICAL DAILY
Qty: 30 PATCH | Refills: 0 | Status: SHIPPED | OUTPATIENT
Start: 2023-09-14 | End: 2023-10-14

## 2023-09-14 ASSESSMENT — PATIENT HEALTH QUESTIONNAIRE - PHQ9
SUM OF ALL RESPONSES TO PHQ QUESTIONS 1-9: 0
SUM OF ALL RESPONSES TO PHQ QUESTIONS 1-9: 0
1. LITTLE INTEREST OR PLEASURE IN DOING THINGS: 0
SUM OF ALL RESPONSES TO PHQ QUESTIONS 1-9: 0
2. FEELING DOWN, DEPRESSED OR HOPELESS: 0
SUM OF ALL RESPONSES TO PHQ9 QUESTIONS 1 & 2: 0
SUM OF ALL RESPONSES TO PHQ QUESTIONS 1-9: 0

## 2023-09-18 ENCOUNTER — OFFICE VISIT (OUTPATIENT)
Age: 63
End: 2023-09-18
Payer: MEDICAID

## 2023-09-18 VITALS
OXYGEN SATURATION: 97 % | DIASTOLIC BLOOD PRESSURE: 81 MMHG | SYSTOLIC BLOOD PRESSURE: 148 MMHG | BODY MASS INDEX: 27.09 KG/M2 | HEIGHT: 60 IN | WEIGHT: 138 LBS | HEART RATE: 76 BPM

## 2023-09-18 DIAGNOSIS — Z21 ASYMPTOMATIC HUMAN IMMUNODEFICIENCY VIRUS (HIV) INFECTION STATUS (HCC): ICD-10-CM

## 2023-09-18 DIAGNOSIS — Z09 HOSPITAL DISCHARGE FOLLOW-UP: Primary | ICD-10-CM

## 2023-09-18 DIAGNOSIS — R07.89 OTHER CHEST PAIN: ICD-10-CM

## 2023-09-18 DIAGNOSIS — I10 ESSENTIAL (PRIMARY) HYPERTENSION: ICD-10-CM

## 2023-09-18 PROCEDURE — 3077F SYST BP >= 140 MM HG: CPT | Performed by: NURSE PRACTITIONER

## 2023-09-18 PROCEDURE — 99214 OFFICE O/P EST MOD 30 MIN: CPT | Performed by: NURSE PRACTITIONER

## 2023-09-18 PROCEDURE — 3078F DIAST BP <80 MM HG: CPT | Performed by: NURSE PRACTITIONER

## 2023-09-18 PROCEDURE — 1111F DSCHRG MED/CURRENT MED MERGE: CPT | Performed by: NURSE PRACTITIONER

## 2023-09-18 PROCEDURE — 1123F ACP DISCUSS/DSCN MKR DOCD: CPT | Performed by: NURSE PRACTITIONER

## 2023-09-18 RX ORDER — AMLODIPINE BESYLATE 10 MG/1
10 TABLET ORAL DAILY
Qty: 90 TABLET | Refills: 3 | Status: SHIPPED | OUTPATIENT
Start: 2023-09-18

## 2023-09-18 ASSESSMENT — PATIENT HEALTH QUESTIONNAIRE - PHQ9
SUM OF ALL RESPONSES TO PHQ9 QUESTIONS 1 & 2: 0
2. FEELING DOWN, DEPRESSED OR HOPELESS: 0
SUM OF ALL RESPONSES TO PHQ QUESTIONS 1-9: 0
1. LITTLE INTEREST OR PLEASURE IN DOING THINGS: 0
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
## 2023-09-20 ASSESSMENT — ENCOUNTER SYMPTOMS
EYE REDNESS: 0
SHORTNESS OF BREATH: 0
VOMITING: 0
SORE THROAT: 0
ABDOMINAL PAIN: 0
NAUSEA: 0
COUGH: 0
BACK PAIN: 0
WHEEZING: 0
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
## 2023-10-02 ENCOUNTER — OFFICE VISIT (OUTPATIENT)
Age: 63
End: 2023-10-02
Payer: MEDICAID

## 2023-10-02 VITALS
WEIGHT: 137 LBS | BODY MASS INDEX: 26.9 KG/M2 | HEART RATE: 76 BPM | HEIGHT: 60 IN | SYSTOLIC BLOOD PRESSURE: 127 MMHG | DIASTOLIC BLOOD PRESSURE: 74 MMHG | OXYGEN SATURATION: 99 %

## 2023-10-02 DIAGNOSIS — I10 ESSENTIAL (PRIMARY) HYPERTENSION: ICD-10-CM

## 2023-10-02 DIAGNOSIS — Z21 ASYMPTOMATIC HUMAN IMMUNODEFICIENCY VIRUS (HIV) INFECTION STATUS (HCC): ICD-10-CM

## 2023-10-02 DIAGNOSIS — R07.89 OTHER CHEST PAIN: ICD-10-CM

## 2023-10-02 DIAGNOSIS — Z09 HOSPITAL DISCHARGE FOLLOW-UP: Primary | ICD-10-CM

## 2023-10-02 PROCEDURE — 1123F ACP DISCUSS/DSCN MKR DOCD: CPT | Performed by: NURSE PRACTITIONER

## 2023-10-02 PROCEDURE — 3078F DIAST BP <80 MM HG: CPT | Performed by: NURSE PRACTITIONER

## 2023-10-02 PROCEDURE — 99214 OFFICE O/P EST MOD 30 MIN: CPT | Performed by: NURSE PRACTITIONER

## 2023-10-02 PROCEDURE — 3074F SYST BP LT 130 MM HG: CPT | Performed by: NURSE PRACTITIONER
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
## 2023-10-02 NOTE — PROGRESS NOTES
1. Have you been to the ER, urgent care clinic since your last visit? Hospitalized since your last visit?     no    2. Have you seen or consulted any other health care providers outside of the [address] [place] [address] since your last visit? Include any pap smears or colon screening.       no

## 2023-10-25 ENCOUNTER — HOSPITAL ENCOUNTER (OUTPATIENT)
Facility: HOSPITAL | Age: 63
Setting detail: RECURRING SERIES
Discharge: HOME | End: 2023-10-28
Payer: MEDICAID

## 2023-10-25 PROCEDURE — 97535 SELF CARE MNGMENT TRAINING: CPT

## 2023-10-25 PROCEDURE — 97161 PT EVAL LOW COMPLEX 20 MIN: CPT
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
## 2023-11-02 ENCOUNTER — APPOINTMENT (OUTPATIENT)
Facility: HOSPITAL | Age: 63
End: 2023-11-02
Payer: MEDICAID

## 2023-11-08 ENCOUNTER — HOSPITAL ENCOUNTER (OUTPATIENT)
Facility: HOSPITAL | Age: 63
Setting detail: RECURRING SERIES
Discharge: HOME | End: 2023-11-11
Payer: MEDICAID

## 2023-11-08 PROCEDURE — 97112 NEUROMUSCULAR REEDUCATION: CPT

## 2023-11-08 PROCEDURE — 97530 THERAPEUTIC ACTIVITIES: CPT

## 2023-11-08 PROCEDURE — 97110 THERAPEUTIC EXERCISES: CPT

## 2023-11-08 NOTE — PROGRESS NOTES
PHYSICAL / OCCUPATIONAL THERAPY - DAILY TREATMENT NOTE (updated )    Patient Name: Alvino Limon    Date: 2023    : 1960  Insurance: Payor: Radha Fail / Plan: Melisa Mom / Product Type: *No Product type* /      Patient  verified YES   Visit #   Current / Total 2 12   Time   In / Out 1250 131   Pain   In / Out 7 7   Subjective Functional Status/Changes: Pt says she's been doing HEP 1-2 times per day. She says it makes her shoulder achy sometimes. Changes to: Allergies, Med Hx, Sx Hx?   no       TREATMENT AREA =  Left shoulder pain [M25.512]    OBJECTIVE        Therapeutic Procedures: Tx Min Billable or 1:1 Min (if diff from Tx Min) Procedure, Rationale, Specifics   18  81693 Therapeutic Exercise (timed):  increase ROM, strength, coordination, balance, and proprioception to improve patient's ability to progress to PLOF and address remaining functional goals. (see flow sheet as applicable)    Details if applicable:       15  64598 Therapeutic Activity (timed):  use of dynamic activities replicating functional movements to increase ROM, strength, coordination, balance, and proprioception in order to improve patient's ability to progress to PLOF and address remaining functional goals. (see flow sheet as applicable)    Details if applicable:     8  67698 Neuromuscular Re-Education (timed):  improve balance, coordination, kinesthetic sense, posture, core stability and proprioception to improve patient's ability to develop conscious control of individual muscles and awareness of position of extremities in order to progress to PLOF and address remaining functional goals.  (see flow sheet as applicable)     Details if applicable:           Details if applicable:            Details if applicable:     39  [facility] Totals Reminder: bill using total billable min of TIMED therapeutic procedures (example: do not include dry needle or estim unattended, both

## 2023-11-10 ENCOUNTER — HOSPITAL ENCOUNTER (OUTPATIENT)
Facility: HOSPITAL | Age: 63
Setting detail: RECURRING SERIES
End: 2023-11-10
Payer: MEDICAID

## 2023-11-10 ENCOUNTER — TELEPHONE (OUTPATIENT)
Facility: HOSPITAL | Age: 63
End: 2023-11-10

## 2023-11-10 NOTE — TELEPHONE ENCOUNTER
[employer] called to cxl the pts appt b/c there was no vendor to  the pt for her appt.  Confirmed appt for 11/13 @ 10:50

## 2023-11-13 ENCOUNTER — TELEPHONE (OUTPATIENT)
Facility: HOSPITAL | Age: 63
End: 2023-11-13

## 2023-11-13 ENCOUNTER — APPOINTMENT (OUTPATIENT)
Facility: HOSPITAL | Age: 63
End: 2023-11-13
Payer: MEDICAID

## 2023-11-17 ENCOUNTER — HOSPITAL ENCOUNTER (OUTPATIENT)
Facility: HOSPITAL | Age: 63
Setting detail: RECURRING SERIES
Discharge: HOME | End: 2023-11-20
Payer: MEDICAID

## 2023-11-17 PROCEDURE — 97112 NEUROMUSCULAR REEDUCATION: CPT

## 2023-11-17 PROCEDURE — 97110 THERAPEUTIC EXERCISES: CPT

## 2023-11-17 PROCEDURE — 97535 SELF CARE MNGMENT TRAINING: CPT

## 2023-11-17 PROCEDURE — 97530 THERAPEUTIC ACTIVITIES: CPT

## 2023-11-17 NOTE — PROGRESS NOTES
function. Eval Status: 10/10 at worst  Current: 5/10 enttering         Long Term Goals: To be accomplished in 12 treatments:  Patient will improve FOTO score by 22 points to improve functional tolerance for ADLs/IALDs. Eval Status: FOTO 30  FOTO score = an established functional score where 100 = no disability     Pt will have painfree shoulder flexion and abduction AROM >/= 120 degrees to aid in functional mechanics for ADLs. Eval Status: flexion 70 degrees, abduction 65 degrees  Current: 124* flexion, 135* abduction goal MET 11/17/23     Pt will have 4/5 global left shoulder strength to return to goals of performing normal daily activities without pain. Eval Status: 3+/5     Patient will improve pain at worst in the left shoulder during overhead motions to </= 1/10  to improve ADL/IADL tolerance and restore prior level of function.   Eval Status: 10/10 at worst    PLAN  Yes  Continue plan of care  []  Upgrade activities as tolerated  []  Discharge due to :  []  Other:    Levi Jeffrey PT    11/17/2023    9:29 AM    Future Appointments   Date Time Provider [address] [place] [address]   11/17/2023 11:30 AM Stephanie Mejias Fortune [facility]   11/21/2023 11:20 AM Dillon Ryan PT [facility]   11/27/2023 11:30 AM Stephanie Mejias Fortune [facility]   4/15/2024 11:30 AM Judy Marcos MD [facility]

## 2023-11-21 ENCOUNTER — HOSPITAL ENCOUNTER (OUTPATIENT)
Facility: HOSPITAL | Age: 63
Setting detail: RECURRING SERIES
Discharge: HOME | End: 2023-11-24
Payer: MEDICAID

## 2023-11-21 PROCEDURE — 97530 THERAPEUTIC ACTIVITIES: CPT

## 2023-11-21 PROCEDURE — 97112 NEUROMUSCULAR REEDUCATION: CPT

## 2023-11-21 PROCEDURE — 97110 THERAPEUTIC EXERCISES: CPT
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
## 2023-11-27 ENCOUNTER — HOSPITAL ENCOUNTER (OUTPATIENT)
Facility: HOSPITAL | Age: 63
Setting detail: RECURRING SERIES
Discharge: HOME | End: 2023-11-30
Payer: MEDICAID

## 2023-11-27 PROCEDURE — 97535 SELF CARE MNGMENT TRAINING: CPT

## 2023-11-27 PROCEDURE — 97530 THERAPEUTIC ACTIVITIES: CPT

## 2023-11-27 PROCEDURE — 97112 NEUROMUSCULAR REEDUCATION: CPT

## 2023-11-27 PROCEDURE — 97110 THERAPEUTIC EXERCISES: CPT

## 2023-11-27 NOTE — PROGRESS NOTES
PHYSICAL / OCCUPATIONAL THERAPY - DAILY TREATMENT NOTE (updated )    Patient Name: Scott Carcamo    Date: 2023    : 1960  Insurance: Payor: Dawson Barber / Plan: Nasim Loss / Product Type: *No Product type* /      Patient  verified Yes     Visit #   Current / Total 5 12   Time   In / Out 1110 1203   Pain   In / Out 2-3 4-5   Subjective Functional Status/Changes: Good. Getting better    Changes to: Allergies, Med Hx, Sx Hx?   no       TREATMENT AREA =  Left shoulder pain [M25.512]    OBJECTIVE    Therapeutic Procedures: Tx Min Billable or 1:1 Min (if diff from Tx Min) Procedure, Rationale, Specifics   20  38926 Therapeutic Exercise (timed):  increase ROM, strength, coordination, balance, and proprioception to improve patient's ability to progress to PLOF and address remaining functional goals. (see flow sheet as applicable)    Details if applicable:       13  57358 Neuromuscular Re-Education (timed):  improve balance, coordination, kinesthetic sense, posture, core stability and proprioception to improve patient's ability to develop conscious control of individual muscles and awareness of position of extremities in order to progress to PLOF and address remaining functional goals. (see flow sheet as applicable)    Details if applicable:     10  05466 Therapeutic Activity (timed):  use of dynamic activities replicating functional movements to increase ROM, strength, coordination, balance, and proprioception in order to improve patient's ability to progress to PLOF and address remaining functional goals. (see flow sheet as applicable)     Details if applicable:     10  75959 Self Care/Home Management (timed):  improve patient knowledge and understanding of pain reducing techniques, positioning, and posture/ergonomics  to improve patient's ability to progress to PLOF and address remaining functional goals.   (see flow sheet as applicable)    Details if

## 2023-12-04 ENCOUNTER — TELEPHONE (OUTPATIENT)
Facility: HOSPITAL | Age: 63
End: 2023-12-04

## 2023-12-04 ENCOUNTER — HOSPITAL ENCOUNTER (OUTPATIENT)
Facility: HOSPITAL | Age: 63
Setting detail: RECURRING SERIES
End: 2023-12-04
Payer: MEDICAID

## 2023-12-08 ENCOUNTER — HOSPITAL ENCOUNTER (OUTPATIENT)
Facility: HOSPITAL | Age: 63
Setting detail: RECURRING SERIES
Discharge: HOME | End: 2023-12-11
Payer: MEDICAID

## 2023-12-08 PROCEDURE — 97530 THERAPEUTIC ACTIVITIES: CPT

## 2023-12-08 PROCEDURE — 97110 THERAPEUTIC EXERCISES: CPT

## 2023-12-08 NOTE — PROGRESS NOTES
PHYSICAL / OCCUPATIONAL THERAPY - DAILY TREATMENT NOTE (updated )    Patient Name: Alvino Limon    Date: 2023    : 1960  Insurance: Payor: Radha Mahajan / Plan: Melisa Mom / Product Type: *No Product type* /      Patient  verified YES   Visit #   Current / Total 6 12   Time   In / Out 1138 1216   Pain   In / Out 4 4   Subjective Functional Status/Changes: The shoulder is feeling fine today   Changes to: Allergies, Med Hx, Sx Hx?   no       TREATMENT AREA =  Left shoulder pain [M25.512]    OBJECTIVE      Therapeutic Procedures: Tx Min Billable or 1:1 Min (if diff from Tx Min) Procedure, Rationale, Specifics   28  71587 Therapeutic Exercise (timed):  increase ROM, strength, coordination, balance, and proprioception to improve patient's ability to progress to PLOF and address remaining functional goals. (see flow sheet as applicable)    Details if applicable:       10  57082 Neuromuscular Re-Education (timed):  improve balance, coordination, kinesthetic sense, posture, core stability and proprioception to improve patient's ability to develop conscious control of individual muscles and awareness of position of extremities in order to progress to PLOF and address remaining functional goals.  (see flow sheet as applicable)    Details if applicable:            Details if applicable:           Details if applicable:            Details if applicable:     45  [facility] Totals Reminder: bill using total billable min of TIMED therapeutic procedures (example: do not include dry needle or estim unattended, both untimed codes, in totals to left)  8-22 min = 1 unit; 23-37 min = 2 units; 38-52 min = 3 units; 53-67 min = 4 units; 68-82 min = 5 units   Total Total     TOTAL TREATMENT TIME:        38     [x]  Patient Education billed concurrently with other procedures   [x] Review HEP    [] Progressed/Changed HEP, detail:    [] Other detail:       Objective

## 2023-12-20 ENCOUNTER — APPOINTMENT (OUTPATIENT)
Facility: HOSPITAL | Age: 63
End: 2023-12-20
Payer: MEDICAID

## 2023-12-22 ENCOUNTER — HOSPITAL ENCOUNTER (OUTPATIENT)
Facility: HOSPITAL | Age: 63
Setting detail: RECURRING SERIES
End: 2023-12-22
Payer: MEDICAID

## 2023-12-22 ENCOUNTER — HOSPITAL ENCOUNTER (OUTPATIENT)
Facility: HOSPITAL | Age: 63
Setting detail: RECURRING SERIES
Discharge: HOME | End: 2023-12-25
Payer: MEDICAID

## 2023-12-22 PROCEDURE — 97530 THERAPEUTIC ACTIVITIES: CPT

## 2023-12-22 PROCEDURE — 97110 THERAPEUTIC EXERCISES: CPT
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
## 2023-12-28 ENCOUNTER — HOSPITAL ENCOUNTER (OUTPATIENT)
Facility: HOSPITAL | Age: 63
Setting detail: RECURRING SERIES
Discharge: HOME | End: 2023-12-31
Payer: MEDICAID

## 2023-12-28 PROCEDURE — 97110 THERAPEUTIC EXERCISES: CPT

## 2023-12-28 PROCEDURE — 97112 NEUROMUSCULAR REEDUCATION: CPT

## 2023-12-28 PROCEDURE — 97530 THERAPEUTIC ACTIVITIES: CPT

## 2023-12-28 NOTE — PROGRESS NOTES
score where 100 = no disability     Pt will have painfree shoulder flexion and abduction AROM >/= 120 degrees to aid in functional mechanics for ADLs.  Eval Status: flexion 70 degrees, abduction 65 degrees  PN: 115* flexion, 121* abduction progressing well 11/21/23  Last PN: flexion 142*, abduction 135*goal MET 12/22/23     Pt will have 4/5 global left shoulder strength to return to goals of performing normal daily activities without pain.  Eval Status: 3+/5  PN 4/5 flexion, abduction, ER/IR, biceps, triceps goal MET  11/21/23     Patient will improve pain at worst in the left shoulder during overhead motions to </= 1/10  to improve ADL/IADL tolerance and restore prior level of function.  Eval Status: 10/10 at worst  PN 7/10 max pain progressing 11/21/23  Last PN: 5/10 progressing 12/22/23  Current: progression with 0/10 pain entering and leaving PT this date 12/28/23     Updated goal 11/21/23  Pt will be able to lift 5# overhead to be able to complete ADLs with increased ease  PN: pain and unable   Last PN:  5# overhead with no pain goal MET 12/15/23    PLAN  YES Continue plan of care  [x]  Upgrade activities as tolerated  []  Discharge due to :  []  Other:    Jamil Cosme PT, DPT    12/28/2023    10:23 AM    Future Appointments   Date Time Provider Department Center   12/28/2023 11:20 AM Jamil Cosme PT [facility]   12/29/2023 12:50 PM Jamil Cosme PT [facility]   4/15/2024 11:30 AM Audrey Medina MD [facility]

## 2023-12-29 ENCOUNTER — APPOINTMENT (OUTPATIENT)
Facility: HOSPITAL | Age: 63
End: 2023-12-29
Payer: MEDICAID

## 2024-04-09 ENCOUNTER — OFFICE VISIT (OUTPATIENT)
Age: 64
End: 2024-04-09
Payer: MEDICAID

## 2024-04-09 VITALS
OXYGEN SATURATION: 99 % | DIASTOLIC BLOOD PRESSURE: 68 MMHG | HEIGHT: 60 IN | SYSTOLIC BLOOD PRESSURE: 118 MMHG | BODY MASS INDEX: 25.52 KG/M2 | HEART RATE: 74 BPM | WEIGHT: 130 LBS

## 2024-04-09 DIAGNOSIS — I45.2 BIFASCICULAR BLOCK: ICD-10-CM

## 2024-04-09 DIAGNOSIS — R07.89 OTHER CHEST PAIN: Primary | ICD-10-CM

## 2024-04-09 DIAGNOSIS — Z21 HIV POSITIVE (HCC): ICD-10-CM

## 2024-04-09 DIAGNOSIS — E78.5 HYPERLIPIDEMIA, UNSPECIFIED HYPERLIPIDEMIA TYPE: ICD-10-CM

## 2024-04-09 DIAGNOSIS — I10 ESSENTIAL (PRIMARY) HYPERTENSION: ICD-10-CM

## 2024-04-09 PROCEDURE — 1123F ACP DISCUSS/DSCN MKR DOCD: CPT | Performed by: INTERNAL MEDICINE

## 2024-04-09 PROCEDURE — 3078F DIAST BP <80 MM HG: CPT | Performed by: INTERNAL MEDICINE

## 2024-04-09 PROCEDURE — 3074F SYST BP LT 130 MM HG: CPT | Performed by: INTERNAL MEDICINE

## 2024-04-09 PROCEDURE — 99214 OFFICE O/P EST MOD 30 MIN: CPT | Performed by: INTERNAL MEDICINE

## 2024-04-09 RX ORDER — ATORVASTATIN CALCIUM 20 MG/1
20 TABLET, FILM COATED ORAL DAILY
COMMUNITY

## 2024-04-09 RX ORDER — AMMONIUM LACTATE 12 G/100G
LOTION TOPICAL PRN
COMMUNITY

## 2024-04-09 ASSESSMENT — PATIENT HEALTH QUESTIONNAIRE - PHQ9
SUM OF ALL RESPONSES TO PHQ QUESTIONS 1-9: 0
SUM OF ALL RESPONSES TO PHQ QUESTIONS 1-9: 0
SUM OF ALL RESPONSES TO PHQ9 QUESTIONS 1 & 2: 0
SUM OF ALL RESPONSES TO PHQ QUESTIONS 1-9: 0
2. FEELING DOWN, DEPRESSED OR HOPELESS: NOT AT ALL
1. LITTLE INTEREST OR PLEASURE IN DOING THINGS: NOT AT ALL
SUM OF ALL RESPONSES TO PHQ QUESTIONS 1-9: 0

## 2024-04-09 ASSESSMENT — ENCOUNTER SYMPTOMS
RESPIRATORY NEGATIVE: 1
EYES NEGATIVE: 1
GASTROINTESTINAL NEGATIVE: 1

## 2024-04-09 NOTE — PROGRESS NOTES
systolic function (ejection fraction normal). Wall motion: normal. Mild (grade 1) left ventricular diastolic dysfunction.  RV: Normal right ventricular size and function.  TV: Right Ventricular Arterial Pressure (RVSP) is 19 mmHg. Pulmonary hypertension not suggested by Doppler findings.  No hemodynamically significant valvular pathology.      Comparison Study Information  Prior Study  There is a prior study available for comparison. Prior study date: 12/22/2014. As compared to the previous study, there are no significant changes.     Echo  9/2023  Interpretation Summary    Result status: Final result       Left Ventricle: Normal left ventricular systolic function with a visually estimated EF of 55 - 60%. Left ventricle size is normal. Normal wall thickness. Normal wall motion. Normal diastolic function.     Comparison Study Information    Prior Study    There is a prior study available for comparison. Prior study date: 8/19/2021. As compared to the previous study, there are no significant changes.     Echo Findings    Left Ventricle Normal left ventricular systolic function with a visually estimated EF of 55 - 60%. Left ventricle size is normal. Normal wall thickness. Normal wall motion. Normal diastolic function.   Left Atrium Left atrium size is normal. LA Vol Index A/L is 30 mL/m2.   Right Ventricle Right ventricle size is normal. Normal systolic function.   Right Atrium Right atrium size is normal.   Aortic Valve Valve structure is normal. No regurgitation. No stenosis.   Mitral Valve Valve structure is normal. No regurgitation. No stenosis noted.   Tricuspid Valve Valve structure is normal. No regurgitation. No stenosis noted.   Pulmonic Valve Valve structure is normal. No regurgitation. No stenosis noted.   Pulmonary Artery Pulmonary hypertension not present.   Aorta Normal sized aortic root.   IVC/Hepatic Veins IVC diameter is less than or equal to 21 mm and decreases greater than 50% during inspiration;

## 2024-05-08 ENCOUNTER — TELEPHONE (OUTPATIENT)
Age: 64
End: 2024-05-08

## 2024-05-08 NOTE — TELEPHONE ENCOUNTER
Patient was seen by you on 4/9/24 and you ordered a stress echo on patient. Put in an authorization for stress echo and [place] denied it due to the test measures should be used if you are moderate or high risk (more than 15%) for heart disease. Age, gender, and the character of the chest pain are used to calculate this risk. After reviewing the records they do not show that you are at moderate or high risk of heart disease. Please Advise.

## 2024-05-10 NOTE — TELEPHONE ENCOUNTER
Called patient to schedule a regular stress test. Patient was not at home states she will call us back to schedule regular stress.

## 2024-08-19 ENCOUNTER — HOSPITAL ENCOUNTER (EMERGENCY)
Age: 64
Discharge: ANOTHER HOSPITAL | End: 2024-08-19
Attending: EMERGENCY MEDICINE
Payer: MEDICAID

## 2024-08-19 VITALS
HEART RATE: 90 BPM | OXYGEN SATURATION: 100 % | DIASTOLIC BLOOD PRESSURE: 88 MMHG | HEIGHT: 60 IN | TEMPERATURE: 97.8 F | BODY MASS INDEX: 25.25 KG/M2 | SYSTOLIC BLOOD PRESSURE: 147 MMHG | WEIGHT: 128.6 LBS | RESPIRATION RATE: 18 BRPM
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
## 2024-08-19 PROBLEM — T74.21XA SEXUAL ASSAULT OF ADULT: Status: ACTIVE | Noted: 2024-08-19

## 2024-08-19 PROCEDURE — 99285 EMERGENCY DEPT VISIT HI MDM: CPT

## 2024-08-19 ASSESSMENT — LIFESTYLE VARIABLES
HOW MANY STANDARD DRINKS CONTAINING ALCOHOL DO YOU HAVE ON A TYPICAL DAY: PATIENT DOES NOT DRINK
HOW OFTEN DO YOU HAVE A DRINK CONTAINING ALCOHOL: NEVER

## 2024-08-19 ASSESSMENT — PAIN SCALES - GENERAL: PAINLEVEL_OUTOF10: 0

## 2024-08-19 ASSESSMENT — PAIN - FUNCTIONAL ASSESSMENT: PAIN_FUNCTIONAL_ASSESSMENT: 0-10

## 2024-08-19 NOTE — ED PROVIDER NOTES
capsule by mouth once a week      atorvastatin (LIPITOR) 20 MG tablet Take 1 tablet by mouth daily      ammonium lactate (LAC-HYDRIN) 12 % lotion Apply topically as needed for Dry Skin Apply topically as needed.      Ketoconazole-Hydrocortisone 2 & 1 % KIT Apply topically      amLODIPine (NORVASC) 10 MG tablet Take 1 tablet by mouth daily  Qty: 90 tablet, Refills: 3      ibuprofen (ADVIL;MOTRIN) 800 MG tablet Take 1 tablet by mouth 3 times daily as needed for Pain  Qty: 21 tablet, Refills: 0      Green Tea, Sadie sinensis, (GREEN TEA EXTRACT PO) Take by mouth      Abacavir-Dolutegravir-Lamivud 600- MG TABS Take by mouth daily             DISCONTINUED MEDICATIONS:  Current Discharge Medication List          I am the Primary Clinician of Record.   Betty Le MD (electronically signed)    (Please note that parts of this dictation were completed with voice recognition software. Quite often unanticipated grammatical, syntax, homophones, and other interpretive errors are inadvertently transcribed by the computer software. Please disregards these errors. Please excuse any errors that have escaped final proofreading.)        Betty Le MD  08/19/24 1208
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
## 2024-08-23 NOTE — PLAN OF CARE
Plan/Goals for the next 4-6 weeks:    1. Start Food journal beginning tomorrow morning, record all food and drink.    2. Drink at least 64 ounces of water daily.    3. Increase activity to at least 30-45 minutes 3-5 days during this week. Start slow and build up as tolerated.    4. Track your daily intake on [employer] from today until our next visit. We will set your goals up on our next visit. The sandoval helps track calories and food accountability.    Return for next visit in 4-6 week       4 Steps for Eating Healthier  Changing the way you eat can improve your health. It can lower your cholesterol and blood pressure, and help you stay at a healthy weight. Your diet doesn’t have to be bland and boring to be healthy. Just watch your calories and follow these steps:    Step 1. Eat fewer unhealthy fats  Choose more fish and lean meats instead of fatty cuts of meat.  Skip butter and lard, and use less margarine.  Pass on foods that have palm, coconut, or hydrogenated oils.  Eat fewer high-fat dairy foods like cheese, ice cream, and whole milk.  Get a heart-healthy cookbook and try some low-fat recipes.  Step 2. Go light on salt  Keep the saltshaker off the table.  Limit high-salt ingredients, such as soy sauce, bouillon, and garlic salt.  Instead of adding salt when cooking, season your food with herbs and flavorings. Try lemon, garlic, and onion, or salt-free herb seasonings.  Limit convenience foods, such as boxed or canned foods and restaurant food.  Read food labels and choose lower-sodium options.  Step 3. Limit sugar  Pause before you add sugars to pancakes, cereal, coffee, or tea. This includes white and brown table sugar, syrup, honey, and molasses. Cut your usual amount by half.  Use non-sugar sweeteners. Stevia, aspartame, and sucralose can satisfy a sweet tooth without adding calories.  Swap out sugar-filled soda and other drinks. Buy sugar-free or low-calorie beverages. Remember water is always the best  Problem: Discharge Planning  Goal: Discharge to home or other facility with appropriate resources  Outcome: Progressing     Problem: Pain  Goal: Verbalizes/displays adequate comfort level or baseline comfort level  Outcome: Progressing     Problem: Skin/Tissue Integrity  Goal: Absence of new skin breakdown  Description: 1. Monitor for areas of redness and/or skin breakdown  2. Assess vascular access sites hourly  3. Every 4-6 hours minimum:  Change oxygen saturation probe site  4. Every 4-6 hours:  If on nasal continuous positive airway pressure, respiratory therapy assess nares and determine need for appliance change or resting period.   Outcome: Progressing     Problem: Safety - Adult  Goal: Free from fall injury  Outcome: Progressing     Problem: ABCDS Injury Assessment  Goal: Absence of physical injury  Outcome: Progressing choice.  Read labels and choose foods with less added sugar. Keep in mind that dairy foods and foods with fruit will have some natural sugar.  Cut the sugar in recipes by 1/3 to 1/2. Boost the flavor with extracts like almond, vanilla, or orange. Or add spices such as cinnamon or nutmeg.  Step 4. Eat more fiber  Eat fresh fruits and vegetables every day.  Boost your diet with whole grains. Go for oats, whole-grain rice, and bran.  Add beans and lentils to your meals.  Drink more water to match your fiber increase to help prevent constipation.  Date Last Reviewed: 6/1/2017  © 0178-1696 [employer]. [address] [place] [id number]. All rights reserved. This information is not intended as a substitute for professional medical care. Always follow your healthcare professional's instructions.          Understanding Body Mass Index (BMI)  Body mass index (BMI) is a method of screening for a weight category using the ratio of your height to your weight. The BMI is a measure of overweight that is corrected for height. Knowing your BMI is a way to tell if you are at a healthy weight. The higher your BMI, the greater your risk for weight-related health problems.  What BMI means  BMI below 18.5: Underweight  BMI 18.5 to 24.9: Healthy weight or \"ideal body weight\"   BMI 25 to 29.9: Overweight  BMI 30 and over: Obese  BMI 40 and over: Severe obesity   Online BMI Calculators  Find your BMI with an online BMI calculator tool, such as these from the CDC:  BMI calculator for adults  BMI calculator for children and teens   Using the BMI chart  To figure out your BMI, find your height and weight (or the numbers closest to them) on the table below. Follow each column of numbers to where your height and weight meet on the table. That is your BMI.    Date Last Reviewed: 7/1/2016  © 6871-6704 [employer]. [address] [place] [address]. All rights reserved. This information is not intended  as a substitute for professional medical care. Always follow your healthcare professional's instructions.          Weight Management: Getting Started  Healthy bodies come in all shapes and sizes. Not all bodies are made to be thin. For some people, a healthy weight is higher than the average weight listed on weight charts. Your healthcare provider can help you decide on a healthy weight for you.   Reasons to lose weight  Losing weight can help with some health problems, such as high blood pressure, heart disease, diabetes, sleep apnea, and arthritis. You may also feel more energy.   Set your long-term goal  Your goal doesn't even have to be a specific weight. You may decide on a fitness goal such as being able to walk 10 miles a week, or a health goal such as lowering your blood pressure. Choose a goal that is measurable and reasonable, so you know when you've reached it. A goal of reaching a BMI of less than 25 is not always reasonable (or possible).    Make an action plan  Habits don’t change overnight. Setting your goals too high can leave you feeling discouraged if you can’t reach them. Be realistic. Choose one or two small changes you can make now. Set an action plan for how you are going to make these changes. When you can stick to this plan, keep making a few more small changes. Taking small steps will help you stay on the path to success.   Track your progress  Write down your goals. Then keep a daily record of your progress. Write down what you eat and how active you are. This record lets you look back on how much you’ve done. It may also help when you’re feeling frustrated. Reward yourself for success. Even if you don’t reach every goal, give yourself credit for what you do get done.     Get support  Encouragement from others can help make losing weight easier. Ask your family members and friends for support. They may even want to join you. Also look to your healthcare provider, registered dietitian, and   for help. Your local hospital can give you more information about nutrition, exercise, and weight loss. Get a thorough checkup before you start any exercise program or change your diet.   Krys last reviewed this educational content on 11/1/2020  © 2634-4179 [employer]. All rights reserved. This information is not intended as a substitute for professional medical care. Always follow your healthcare professional's instructions.          Working with a Healthcare Provider Who Specializes in Obesity  Some healthcare providers focus on treating people who are obese. They are called bariatric healthcare providers or bariatricians. Some may also be bariatric surgeons. These healthcare providers are trained to do surgery that aids in weight loss.  A general healthcare provider can offer some treatments for weight loss. But a bariatric healthcare provider has more training in how to treat obesity. These healthcare providers have had special training after medical school. Many of them have additional training to do weight loss surgery.  What is obesity?  Obesity is when body fat is above a certain level. Body mass index (BMI) is a common way to measure obesity. BMI is a method of screening for a weight category using height and weight for calculation. A BMI of 25 to 29.9 means overweight. A BMI higher than 30 means that the patient is obese. Your healthcare provider can calculate your BMI for you. You can also ask your healthcare provider to teach you how to calculate BMI yourself.  Why see a healthcare provider who specializes in obesity?  If you are obese, it’s important to you get treatment. Obesity can lead to a number of serious health conditions, including:  Diabetes  Arthritis  High blood pressure  Heart disease  Stroke  Sleep apnea  Liver disease  Certain lung diseases  Certain cancers  You may begin your treatment with your primary healthcare provider. But if you need more  help, you may want to see a bariatric healthcare provider. He or she may have new ideas or methods for weight loss that can help you. Some bariatric healthcare providers can also serve as a primary healthcare provider.  What to expect at your first visit  During your initial visit, your bariatric healthcare provider may:  Take a medical history, including your history of nutrition, exercise, and weight loss  Do a physical exam, including BMI, waist circumference, and blood pressure  Look at your health problems related to obesity  Look at you for other medical problems that might cause weight gain  Find out how ready you are to begin an exercise program  Find out if you need tests  Help you make realistic weight loss goals  Give you a nutrition plan  Tell you to keep a food diary  Find out if you need a weight-loss medicine  Your bariatric healthcare provider will also give you information about:  Healthy eating habits  Healthy exercise habits  How to change health behaviors  How mental health affects obesity  The complications of obesity  The benefits and risks of medicines  Creating a treatment plan for you  Your healthcare provider will create a treatment plan for you based on your medical needs and preferences. At each follow-up visit, your healthcare provider will check your progress. He or she will make changes to your treatment as needed. If you aren’t losing enough weight, your healthcare provider will advise other changes. As you lose weight and your health improves, your healthcare provider might change some of your medicines.  Your bariatric healthcare provider may order some tests to check health factors related to obesity, such as:  Tests for diabetes, like fasting blood glucose  Lipid and cholesterol levels  Thyroid-stimulating hormone levels  Liver blood tests  Kidney function blood tests  Vitamin D levels  Electrocardiogram, to look at your heart rhythm  Exercise testing, to see how well your heart  works during exercise  Resting metabolic rate, to look at how many calories you burn at rest  Your healthcare provider will also talk with you about your changing needs. For example, if your weight loss stops or you regain weight, he or she may talk with you about weight loss surgery.     How to find a healthcare provider  Talk with your primary healthcare provider. He or she may be able to refer you to a bariatric healthcare provider. The Obesity Medicine Association, formerly known as American Society of Bariatric Physicians, has an online listing of healthcare providers. You can search for a healthcare provider in your area.   Date Last Reviewed: 2/14/2016  © 4472-4033 [employer]. [address] [place] [address], [place] [address]. All rights reserved. This information is not intended as a substitute for professional medical care. Always follow your healthcare professional's instructions.

## 2024-09-24 DIAGNOSIS — I49.1 ATRIAL PREMATURE DEPOLARIZATION: Primary | ICD-10-CM

## 2024-09-25 ENCOUNTER — TELEPHONE (OUTPATIENT)
Age: 64
End: 2024-09-25

## 2024-09-25 DIAGNOSIS — I10 HTN (HYPERTENSION): Primary | ICD-10-CM

## 2024-09-25 RX ORDER — METOPROLOL SUCCINATE 25 MG/1
25 TABLET, EXTENDED RELEASE ORAL DAILY
Qty: 90 TABLET | Refills: 3 | Status: SHIPPED | OUTPATIENT
Start: 2024-09-25

## 2024-11-06 ENCOUNTER — TELEPHONE (OUTPATIENT)
Age: 64
End: 2024-11-06

## 2024-11-07 NOTE — TELEPHONE ENCOUNTER
Event monitor reviewed - symptoms were associated with NSR.                SVE burden 0.08% including 9 beat PAT.  At 150 bpm    With symptoms of chest pain, dizziness, fatigue, patient in sinus rhythm in 60s to 80s.    0.04% PVCs.

## 2024-11-07 NOTE — TELEPHONE ENCOUNTER
Spoke to patient per Rudolph Moise NP regarding [facility].  Event monitor reviewed - symptoms were associated with NSR.       SVE burden 0.08% including 9 beat PAT.  At 150 bpm    With symptoms of chest pain, dizziness, fatigue, patient in sinus rhythm in 60s to 80s.    0.04% PVCs. She voices understanding and acceptance of this advice and will call back if any further questions or concerns.

## 2024-12-26 ENCOUNTER — OFFICE VISIT (OUTPATIENT)
Age: 64
End: 2024-12-26
Payer: MEDICAID

## 2024-12-26 VITALS
OXYGEN SATURATION: 99 % | SYSTOLIC BLOOD PRESSURE: 123 MMHG | BODY MASS INDEX: 26.9 KG/M2 | HEART RATE: 73 BPM | WEIGHT: 137 LBS | DIASTOLIC BLOOD PRESSURE: 69 MMHG | HEIGHT: 60 IN

## 2024-12-26 DIAGNOSIS — Z21 HIV POSITIVE (HCC): ICD-10-CM

## 2024-12-26 DIAGNOSIS — I49.1 ATRIAL PREMATURE DEPOLARIZATION: Primary | ICD-10-CM

## 2024-12-26 DIAGNOSIS — I10 HYPERTENSION, UNSPECIFIED TYPE: ICD-10-CM

## 2024-12-26 DIAGNOSIS — I49.1 ATRIAL PREMATURE DEPOLARIZATION: ICD-10-CM

## 2024-12-26 DIAGNOSIS — E78.5 HYPERLIPIDEMIA, UNSPECIFIED HYPERLIPIDEMIA TYPE: ICD-10-CM

## 2024-12-26 PROCEDURE — 3078F DIAST BP <80 MM HG: CPT | Performed by: NURSE PRACTITIONER

## 2024-12-26 PROCEDURE — 99214 OFFICE O/P EST MOD 30 MIN: CPT | Performed by: NURSE PRACTITIONER

## 2024-12-26 PROCEDURE — 3074F SYST BP LT 130 MM HG: CPT | Performed by: NURSE PRACTITIONER

## 2024-12-26 PROCEDURE — 1123F ACP DISCUSS/DSCN MKR DOCD: CPT | Performed by: NURSE PRACTITIONER

## 2024-12-26 ASSESSMENT — PATIENT HEALTH QUESTIONNAIRE - PHQ9
SUM OF ALL RESPONSES TO PHQ QUESTIONS 1-9: 0
SUM OF ALL RESPONSES TO PHQ QUESTIONS 1-9: 0
SUM OF ALL RESPONSES TO PHQ9 QUESTIONS 1 & 2: 0
2. FEELING DOWN, DEPRESSED OR HOPELESS: NOT AT ALL
1. LITTLE INTEREST OR PLEASURE IN DOING THINGS: NOT AT ALL
DEPRESSION UNABLE TO ASSESS: FUNCTIONAL CAPACITY MOTIVATION LIMITS ACCURACY
SUM OF ALL RESPONSES TO PHQ QUESTIONS 1-9: 0
SUM OF ALL RESPONSES TO PHQ QUESTIONS 1-9: 0

## 2024-12-26 ASSESSMENT — ENCOUNTER SYMPTOMS
GASTROINTESTINAL NEGATIVE: 1
EYES NEGATIVE: 1
RESPIRATORY NEGATIVE: 1

## 2024-12-26 NOTE — PROGRESS NOTES
1. Have you been to the ER, urgent care clinic since your last visit?  Hospitalized since your last visit?     No    2. Have you seen or consulted any other health care providers outside of the [facility] since your last visit?  Include any pap smears or colon screening.      No     
rate and blood pressure response.   Essentially normal stress echo findings but this should be called inconclusive as she did not maintain a very high heart rate during the post-stress echo images.   Clinical correlation is suggested.       8/2021 Echo   Interpretation Summary  LV: Calculated LVEF is 55%. Normal cavity size, wall thickness and systolic function (ejection fraction normal). Wall motion: normal. Mild (grade 1) left ventricular diastolic dysfunction.  RV: Normal right ventricular size and function.  TV: Right Ventricular Arterial Pressure (RVSP) is 19 mmHg. Pulmonary hypertension not suggested by Doppler findings.  No hemodynamically significant valvular pathology.      Comparison Study Information  Prior Study  There is a prior study available for comparison. Prior study date: 12/22/2014. As compared to the previous study, there are no significant changes.     Echo  9/2023  Interpretation Summary    Result status: Final result       Left Ventricle: Normal left ventricular systolic function with a visually estimated EF of 55 - 60%. Left ventricle size is normal. Normal wall thickness. Normal wall motion. Normal diastolic function.     Comparison Study Information    Prior Study    There is a prior study available for comparison. Prior study date: 8/19/2021. As compared to the previous study, there are no significant changes.     Echo Findings    Left Ventricle Normal left ventricular systolic function with a visually estimated EF of 55 - 60%. Left ventricle size is normal. Normal wall thickness. Normal wall motion. Normal diastolic function.   Left Atrium Left atrium size is normal. LA Vol Index A/L is 30 mL/m2.   Right Ventricle Right ventricle size is normal. Normal systolic function.   Right Atrium Right atrium size is normal.   Aortic Valve Valve structure is normal. No regurgitation. No stenosis.   Mitral Valve Valve structure is normal. No regurgitation. No stenosis noted.   Tricuspid Valve Valve

## 2024-12-28 LAB
MAGNESIUM SERPL-MCNC: 2.2 MG/DL (ref 1.6–2.3)
T4 FREE SERPL-MCNC: 1.06 NG/DL (ref 0.82–1.77)
TSH SERPL DL<=0.005 MIU/L-ACNC: 0.4 UIU/ML (ref 0.45–4.5)

## 2024-12-31 ENCOUNTER — TELEPHONE (OUTPATIENT)
Age: 64
End: 2024-12-31

## 2024-12-31 NOTE — TELEPHONE ENCOUNTER
Called and spoke to patient regarding lab/test. Lab reviewed. Please call patient, advise labs are normal.   Follow up as scheduled. She voices understanding and acceptance of this advice and will call back if any further questions or concerns.

## 2024-12-31 NOTE — TELEPHONE ENCOUNTER
----- Message from JULIETH Lopez NP sent at 12/31/2024 11:55 AM EST -----  Please call patient, advise labs are normal.   Follow up as scheduled

## 2025-01-03 ENCOUNTER — TELEPHONE (OUTPATIENT)
Age: 65
End: 2025-01-03

## 2025-01-03 NOTE — TELEPHONE ENCOUNTER
----- Message from JULIETH Lopez NP sent at 12/31/2024 12:56 PM EST -----  Please call the patient regarding her abnormal result.  TSH remains low, but improved - to f/u with PCP

## 2025-01-03 NOTE — TELEPHONE ENCOUNTER
Spoke to patient per Rudolph Moise NP regarding lab/test. Lab reviewed. Please call the patient regarding her abnormal result.  TSH remains low, but improved - to f/u with PCP. She voices understanding and acceptance of this advice and will call back if any further questions or concerns.

## 2025-04-24 ENCOUNTER — TELEPHONE (OUTPATIENT)
Age: 65
End: 2025-04-24

## 2025-04-24 NOTE — TELEPHONE ENCOUNTER
Spoke to patient per Dr. Hensley regarding swelling. Stop salty foods including processed meats and if it persists, call or see me in the office. She voices understanding and acceptance of this advice and will call back if any further questions or concerns.

## 2025-04-24 NOTE — TELEPHONE ENCOUNTER
Patient states over the last week her ankles and feet has been swollen, she states it goes done a little at night but in morning they are still swollen, she states it leaves a ring around her ankles when she take her socks off. Please advise. She states eating some foods with salt but wasn't sure if that was the reason or not. Please advise

## 2025-05-09 ENCOUNTER — OFFICE VISIT (OUTPATIENT)
Age: 65
End: 2025-05-09
Payer: MEDICAID

## 2025-05-09 VITALS
SYSTOLIC BLOOD PRESSURE: 111 MMHG | HEART RATE: 78 BPM | DIASTOLIC BLOOD PRESSURE: 62 MMHG | HEIGHT: 60 IN | WEIGHT: 143 LBS | BODY MASS INDEX: 28.07 KG/M2 | OXYGEN SATURATION: 97 %

## 2025-05-09 DIAGNOSIS — R07.9 CHEST PAIN, UNSPECIFIED TYPE: ICD-10-CM

## 2025-05-09 DIAGNOSIS — I47.19 PAT (PAROXYSMAL ATRIAL TACHYCARDIA): ICD-10-CM

## 2025-05-09 DIAGNOSIS — I10 HYPERTENSION, UNSPECIFIED TYPE: Primary | ICD-10-CM

## 2025-05-09 DIAGNOSIS — R94.31 ABNORMAL EKG: ICD-10-CM

## 2025-05-09 DIAGNOSIS — I10 HYPERTENSION, UNSPECIFIED TYPE: ICD-10-CM

## 2025-05-09 DIAGNOSIS — E78.5 HYPERLIPIDEMIA, UNSPECIFIED HYPERLIPIDEMIA TYPE: ICD-10-CM

## 2025-05-09 PROCEDURE — 3078F DIAST BP <80 MM HG: CPT | Performed by: INTERNAL MEDICINE

## 2025-05-09 PROCEDURE — 99214 OFFICE O/P EST MOD 30 MIN: CPT | Performed by: INTERNAL MEDICINE

## 2025-05-09 PROCEDURE — 93000 ELECTROCARDIOGRAM COMPLETE: CPT | Performed by: INTERNAL MEDICINE

## 2025-05-09 PROCEDURE — 3074F SYST BP LT 130 MM HG: CPT | Performed by: INTERNAL MEDICINE

## 2025-05-09 PROCEDURE — 1123F ACP DISCUSS/DSCN MKR DOCD: CPT | Performed by: INTERNAL MEDICINE

## 2025-05-09 RX ORDER — ASPIRIN 81 MG/1
81 TABLET ORAL DAILY
Qty: 100 TABLET | Refills: 3 | Status: SHIPPED | OUTPATIENT
Start: 2025-05-09

## 2025-05-09 RX ORDER — ACETAMINOPHEN 500 MG
500 TABLET ORAL EVERY 6 HOURS PRN
COMMUNITY

## 2025-05-09 ASSESSMENT — ENCOUNTER SYMPTOMS
EYES NEGATIVE: 1
RESPIRATORY NEGATIVE: 1
GASTROINTESTINAL NEGATIVE: 1

## 2025-05-09 ASSESSMENT — PATIENT HEALTH QUESTIONNAIRE - PHQ9
SUM OF ALL RESPONSES TO PHQ QUESTIONS 1-9: 0
1. LITTLE INTEREST OR PLEASURE IN DOING THINGS: NOT AT ALL
SUM OF ALL RESPONSES TO PHQ QUESTIONS 1-9: 0
SUM OF ALL RESPONSES TO PHQ QUESTIONS 1-9: 0
2. FEELING DOWN, DEPRESSED OR HOPELESS: NOT AT ALL
SUM OF ALL RESPONSES TO PHQ QUESTIONS 1-9: 0
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
## 2025-05-09 NOTE — PROGRESS NOTES
1. Have you been to the ER, urgent care clinic since your last visit?  Hospitalized since your last visit?     No      2.  Where do you normally have your labs drawn?   [facility]    3. Do you need any refills today?   No    4. Which local pharmacy do you use (enter pharmacy)?   [facility]    5. Which mail order pharmacy do you use (enter pharmacy)?   No     6. Are you here for surgical clearance and if so who will be doing your     procedure/surgery (care team)?   NO

## 2025-05-13 LAB
A/G RATIO: 1.5 RATIO (ref 1.1–2.6)
ALBUMIN: 4.2 G/DL (ref 3.5–5)
ALP BLD-CCNC: 72 U/L (ref 40–120)
ALT SERPL-CCNC: 16 U/L (ref 5–40)
ANION GAP SERPL CALCULATED.3IONS-SCNC: 13 MMOL/L (ref 3–15)
AST SERPL-CCNC: 21 U/L (ref 10–37)
BILIRUB SERPL-MCNC: 0.4 MG/DL (ref 0.2–1.2)
BILIRUBIN DIRECT: <0.2 MG/DL (ref 0–0.3)
BUN BLDV-MCNC: 10 MG/DL (ref 6–22)
CALCIUM SERPL-MCNC: 9.8 MG/DL (ref 8.4–10.5)
CHLORIDE BLD-SCNC: 104 MMOL/L (ref 98–110)
CHOLESTEROL, TOTAL: 155 MG/DL (ref 110–200)
CHOLESTEROL/HDL RATIO: 2.3 (ref 0–5)
CO2: 26 MMOL/L (ref 20–32)
CREAT SERPL-MCNC: 0.7 MG/DL (ref 0.8–1.4)
GFR, ESTIMATED: >90 ML/MIN/1.73 SQ.M.
GLOBULIN: 2.8 G/DL (ref 2–4)
GLUCOSE: 80 MG/DL (ref 70–99)
HDLC SERPL-MCNC: 66 MG/DL
LDL CHOLESTEROL: 73 MG/DL (ref 50–99)
LDL/HDL RATIO: 1.1
NON-HDL CHOLESTEROL: 89 MG/DL
POTASSIUM SERPL-SCNC: 4.2 MMOL/L (ref 3.5–5.5)
SODIUM BLD-SCNC: 143 MMOL/L (ref 133–145)
T4 FREE: 1.1 NG/DL (ref 0.9–1.8)
TOTAL PROTEIN: 7 G/DL (ref 6.2–8.1)
TRIGL SERPL-MCNC: 76 MG/DL (ref 40–149)
TSH SERPL DL<=0.05 MIU/L-ACNC: 0.39 MCU/ML (ref 0.27–4.2)
VLDLC SERPL CALC-MCNC: 15 MG/DL (ref 8–30)

## 2025-07-11 ENCOUNTER — TELEPHONE (OUTPATIENT)
Age: 65
End: 2025-07-11

## 2025-07-11 NOTE — TELEPHONE ENCOUNTER
Patients says since she started taking metoprolol it has caused bilateral ankle foot swelling. Nehemiah in the morning.

## 2025-07-14 NOTE — TELEPHONE ENCOUNTER
Spoke with patient and advised her to get bp and hr 2x a day for 3-4 days and bring it in so we may adjust meds accordingly. She was also advised to avoid salt. She is very stressed about edema and thinks its her medications. We will see how she feels around Friday with bp chart.

## 2025-07-24 ENCOUNTER — OFFICE VISIT (OUTPATIENT)
Age: 65
End: 2025-07-24
Payer: MEDICAID

## 2025-07-24 VITALS
SYSTOLIC BLOOD PRESSURE: 120 MMHG | HEIGHT: 60 IN | HEART RATE: 72 BPM | OXYGEN SATURATION: 96 % | BODY MASS INDEX: 27.93 KG/M2 | DIASTOLIC BLOOD PRESSURE: 70 MMHG

## 2025-07-24 DIAGNOSIS — I10 HYPERTENSION, UNSPECIFIED TYPE: Primary | ICD-10-CM

## 2025-07-24 DIAGNOSIS — I47.19 PAT (PAROXYSMAL ATRIAL TACHYCARDIA): ICD-10-CM

## 2025-07-24 DIAGNOSIS — E78.5 HYPERLIPIDEMIA, UNSPECIFIED HYPERLIPIDEMIA TYPE: ICD-10-CM

## 2025-07-24 PROCEDURE — 1123F ACP DISCUSS/DSCN MKR DOCD: CPT | Performed by: NURSE PRACTITIONER

## 2025-07-24 PROCEDURE — 99214 OFFICE O/P EST MOD 30 MIN: CPT | Performed by: NURSE PRACTITIONER

## 2025-07-24 PROCEDURE — 3078F DIAST BP <80 MM HG: CPT | Performed by: NURSE PRACTITIONER

## 2025-07-24 PROCEDURE — 3074F SYST BP LT 130 MM HG: CPT | Performed by: NURSE PRACTITIONER

## 2025-07-24 RX ORDER — AMLODIPINE BESYLATE 5 MG/1
5 TABLET ORAL DAILY
Qty: 90 TABLET | Refills: 2 | Status: SHIPPED | OUTPATIENT
Start: 2025-07-24

## 2025-07-24 RX ORDER — HYDROCHLOROTHIAZIDE 25 MG/1
25 TABLET ORAL EVERY MORNING
Qty: 30 TABLET | Refills: 5 | Status: SHIPPED | OUTPATIENT
Start: 2025-07-24

## 2025-07-24 ASSESSMENT — PATIENT HEALTH QUESTIONNAIRE - PHQ9
SUM OF ALL RESPONSES TO PHQ QUESTIONS 1-9: 0
2. FEELING DOWN, DEPRESSED OR HOPELESS: NOT AT ALL
SUM OF ALL RESPONSES TO PHQ QUESTIONS 1-9: 0
SUM OF ALL RESPONSES TO PHQ QUESTIONS 1-9: 0
1. LITTLE INTEREST OR PLEASURE IN DOING THINGS: NOT AT ALL
SUM OF ALL RESPONSES TO PHQ QUESTIONS 1-9: 0

## 2025-07-24 ASSESSMENT — ENCOUNTER SYMPTOMS
GASTROINTESTINAL NEGATIVE: 1
EYES NEGATIVE: 1
RESPIRATORY NEGATIVE: 1
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
## 2025-07-24 NOTE — PATIENT INSTRUCTIONS
Decrease amlodipine to 5 mg ( may cut current pill in half) - new prescription sent for lower dose of 5 mg    Begin HCTZ 25 mg / day

## 2025-07-24 NOTE — PROGRESS NOTES
1. Have you been to the ER, urgent care clinic since your last visit?  Hospitalized since your last visit?No    2. Have you seen or consulted any other health care providers outside of the [facility] since your last visit?  Include any pap smears or colon screening. No